# Patient Record
Sex: FEMALE | Race: WHITE | NOT HISPANIC OR LATINO | Employment: UNEMPLOYED | URBAN - METROPOLITAN AREA
[De-identification: names, ages, dates, MRNs, and addresses within clinical notes are randomized per-mention and may not be internally consistent; named-entity substitution may affect disease eponyms.]

---

## 2017-01-12 ENCOUNTER — TELEPHONE (OUTPATIENT)
Dept: INTERNAL MEDICINE | Facility: CLINIC | Age: 57
End: 2017-01-12

## 2017-01-12 NOTE — TELEPHONE ENCOUNTER
----- Message from Catherine Jimenez sent at 1/12/2017  9:55 AM CST -----  Contact: pt [phone]  Patient would like to get test results.  Name of test (lab, mammo, etc.):  labs  Date of test:  11/30  Ordering provider: Sigrid  Where was the test performed:  [facility]  Comments:

## 2017-01-12 NOTE — TELEPHONE ENCOUNTER
Note      Called the patient again and lmom for return call for the lab results                       Please see message dated 12/26/16. She was called, but did not answer. A message was left to call the office. Her C-reactive protein was mildly elevated. I would like her to repeat the lab in one week. Should the lab be ordered at [facility] or [facility]?

## 2017-01-13 ENCOUNTER — TELEPHONE (OUTPATIENT)
Dept: INTERNAL MEDICINE | Facility: CLINIC | Age: 57
End: 2017-01-13

## 2017-01-13 NOTE — TELEPHONE ENCOUNTER
----- Message from Catherine Jimenez sent at 1/13/2017  4:30 PM CST -----  Contact: pt [phone] cell  Pt said this is her second message and will like the nurse to call her.

## 2017-01-13 NOTE — TELEPHONE ENCOUNTER
----- Message from Patricia Méndez sent at 1/13/2017 11:40 AM CST -----  Contact: self/[phone]  Pt called in regards to a missed call from the office.      Please advise

## 2017-01-17 ENCOUNTER — TELEPHONE (OUTPATIENT)
Dept: INTERNAL MEDICINE | Facility: CLINIC | Age: 57
End: 2017-01-17

## 2017-01-17 DIAGNOSIS — R74.9 ABNORMAL SERUM ENZYME LEVEL: Primary | ICD-10-CM

## 2017-01-17 NOTE — TELEPHONE ENCOUNTER
----- Message from Juno Siu sent at 1/17/2017 11:26 AM CST -----  Contact: Self [phone]  Pt was calling to let Dr Matta know she went to [facility] and had her labs drawn,Please call

## 2017-01-18 NOTE — TELEPHONE ENCOUNTER
Called and spoke with the patient and advised her as soon as the results are in I will call and advise her of the results termed the call

## 2017-01-18 NOTE — TELEPHONE ENCOUNTER
----- Message from Germaine Kang sent at 1/18/2017  9:31 AM CST -----  Contact: self   Patient would like to get test results.  Name of test (lab, mammo, etc.): lab   Date of test:  01/16  Ordering provider: Dr. Matta   Where was the test performed:  [facility]  Comments:

## 2017-01-19 ENCOUNTER — TELEPHONE (OUTPATIENT)
Dept: INTERNAL MEDICINE | Facility: CLINIC | Age: 57
End: 2017-01-19

## 2017-01-19 NOTE — TELEPHONE ENCOUNTER
Spoke with the patient and advised the results are still pending and she understood advised of the electrolytes and the blood count she verbally understood and termed the call . Advised the patient I will call her as soon as the results come. We are checking for the results daily,.

## 2017-01-19 NOTE — TELEPHONE ENCOUNTER
Please inform patient that blood count and electrolytes are normal.  However, C-reactive protein is still  in process.

## 2017-01-19 NOTE — TELEPHONE ENCOUNTER
----- Message from Juno Siu sent at 1/19/2017 11:52 AM CST -----  Contact: Self [phone]  Pt would like to speak with the nurse regarding her labs results from [facility] ,Please call

## 2017-01-20 ENCOUNTER — TELEPHONE (OUTPATIENT)
Dept: INTERNAL MEDICINE | Facility: CLINIC | Age: 57
End: 2017-01-20

## 2017-01-20 NOTE — TELEPHONE ENCOUNTER
Called and advised the patient and advised that the test was normal. Patient understood and termed the call

## 2017-06-29 ENCOUNTER — TELEPHONE (OUTPATIENT)
Dept: INTERNAL MEDICINE | Facility: CLINIC | Age: 57
End: 2017-06-29

## 2017-06-29 NOTE — TELEPHONE ENCOUNTER
Spoke with the patient and she states she is going through a traumatic issue in her life. Patient states she is not able to function.  Patient states at this rate she feels like she will have a stroke as she is stressed. And she is concerned     Patient states she has been having some abdominal issues also. Patient wants to take some medication as she states she feels as if she is loosing her mind. Patient cant come in the office as there are no slots until July -Aug.. She is not sleeping and she is not working at this time.

## 2017-06-29 NOTE — TELEPHONE ENCOUNTER
----- Message from Joanne Reynolds sent at 6/29/2017 10:46 AM CDT -----  Contact: Self. Call her at [phone]  Patient ask to speak with Dr. Matta only about her anxieties and traumatic experiences.

## 2017-06-30 RX ORDER — LORAZEPAM 0.5 MG/1
0.5 TABLET ORAL EVERY 12 HOURS PRN
Qty: 30 TABLET | Refills: 1 | Status: SHIPPED | OUTPATIENT
Start: 2017-06-30 | End: 2018-04-18

## 2017-07-03 ENCOUNTER — TELEPHONE (OUTPATIENT)
Dept: INTERNAL MEDICINE | Facility: CLINIC | Age: 57
End: 2017-07-03

## 2017-07-05 ENCOUNTER — TELEPHONE (OUTPATIENT)
Dept: INTERNAL MEDICINE | Facility: CLINIC | Age: 57
End: 2017-07-05

## 2017-09-15 DIAGNOSIS — Z12.11 COLON CANCER SCREENING: ICD-10-CM

## 2017-12-05 RX ORDER — PRAVASTATIN SODIUM 20 MG/1
TABLET ORAL
Qty: 30 TABLET | Refills: 3 | Status: SHIPPED | OUTPATIENT
Start: 2017-12-05 | End: 2018-04-18 | Stop reason: SDUPTHER

## 2018-01-30 ENCOUNTER — TELEPHONE (OUTPATIENT)
Dept: INTERNAL MEDICINE | Facility: CLINIC | Age: 58
End: 2018-01-30

## 2018-01-30 NOTE — TELEPHONE ENCOUNTER
----- Message from Catherine Jimenez sent at 1/30/2018 12:25 PM CST -----  Contact: pt [phone]  Doctor appointment and lab have been scheduled.  Please link lab orders to the lab appointment.  Date of doctor appointment:  4-18  Physical or EP:  physical  Date of lab appointment:  Pt would like her labs sent to [facility] a week prior to her appointment  Comments:

## 2018-03-21 ENCOUNTER — TELEPHONE (OUTPATIENT)
Dept: INTERNAL MEDICINE | Facility: CLINIC | Age: 58
End: 2018-03-21

## 2018-03-21 DIAGNOSIS — Z00.00 ROUTINE GENERAL MEDICAL EXAMINATION AT A HEALTH CARE FACILITY: Primary | ICD-10-CM

## 2018-03-21 NOTE — TELEPHONE ENCOUNTER
----- Message from Patricia Brar sent at 3/20/2018  3:59 PM CDT -----  Contact: patient [phone]  Pt called to ask what labs will be scheduled and when her lab orders will be sent to [facility]. Please call to go over the lab orders.

## 2018-04-02 ENCOUNTER — TELEPHONE (OUTPATIENT)
Dept: INTERNAL MEDICINE | Facility: CLINIC | Age: 58
End: 2018-04-02

## 2018-04-03 ENCOUNTER — LAB VISIT (OUTPATIENT)
Dept: LAB | Facility: HOSPITAL | Age: 58
End: 2018-04-03
Attending: INTERNAL MEDICINE
Payer: MEDICAID

## 2018-04-03 ENCOUNTER — TELEPHONE (OUTPATIENT)
Dept: INTERNAL MEDICINE | Facility: CLINIC | Age: 58
End: 2018-04-03

## 2018-04-03 DIAGNOSIS — Z12.11 COLON CANCER SCREENING: ICD-10-CM

## 2018-04-03 LAB — HEMOCCULT STL QL IA: NEGATIVE

## 2018-04-03 PROCEDURE — 82274 ASSAY TEST FOR BLOOD FECAL: CPT

## 2018-04-03 NOTE — TELEPHONE ENCOUNTER
Labs have already been ordered to be done at [facility].  Please advise if patient would prefer to do them at our facility.

## 2018-04-03 NOTE — TELEPHONE ENCOUNTER
----- Message from Patricia Brar sent at 4/2/2018  3:51 PM CDT -----  Contact: patient [phone]  Patient wants to know exactly what labs are being drawn . She requested something to check for Alzheimer's. Please let her know about this. She doesn't like to answer her phone unless she knows who it is so she is going to try to watch the number.

## 2018-04-07 LAB
APPEARANCE UR: CLEAR
BILIRUB UR QL STRIP: NEGATIVE
COLOR UR: YELLOW
GLUCOSE UR QL STRIP: NEGATIVE
HGB UR QL STRIP: NEGATIVE
KETONES UR QL STRIP: NEGATIVE
LEUKOCYTE ESTERASE UR QL STRIP: ABNORMAL
NITRITE UR QL STRIP: NEGATIVE
PH UR STRIP: 5.5 [PH] (ref 5–8)
PROT UR QL STRIP: NEGATIVE
SP GR UR STRIP: 1.02 (ref 1–1.03)

## 2018-04-10 LAB
1,25(OH)2D SERPL-MCNC: 36 PG/ML (ref 18–72)
1,25(OH)2D2 SERPL-MCNC: <8 PG/ML
1,25(OH)2D3 SERPL-MCNC: 36 PG/ML
ALBUMIN SERPL-MCNC: 4.6 G/DL (ref 3.6–5.1)
ALBUMIN/GLOB SERPL: 1.5 (CALC) (ref 1–2.5)
ALP SERPL-CCNC: 65 U/L (ref 33–130)
ALT SERPL-CCNC: 18 U/L (ref 6–29)
AST SERPL-CCNC: 23 U/L (ref 10–35)
BASOPHILS # BLD AUTO: 62 CELLS/UL (ref 0–200)
BASOPHILS NFR BLD AUTO: 1.4 %
BILIRUB SERPL-MCNC: 0.4 MG/DL (ref 0.2–1.2)
BUN SERPL-MCNC: 15 MG/DL (ref 7–25)
BUN/CREAT SERPL: NORMAL (CALC) (ref 6–22)
CALCIUM SERPL-MCNC: 10 MG/DL (ref 8.6–10.4)
CHLORIDE SERPL-SCNC: 106 MMOL/L (ref 98–110)
CHOLEST SERPL-MCNC: 243 MG/DL
CHOLEST/HDLC SERPL: 3.7 (CALC)
CO2 SERPL-SCNC: 28 MMOL/L (ref 20–31)
CREAT SERPL-MCNC: 0.72 MG/DL (ref 0.5–1.05)
EOSINOPHIL # BLD AUTO: 141 CELLS/UL (ref 15–500)
EOSINOPHIL NFR BLD AUTO: 3.2 %
ERYTHROCYTE [DISTWIDTH] IN BLOOD BY AUTOMATED COUNT: 13.1 % (ref 11–15)
GFR SERPL CREATININE-BSD FRML MDRD: 92 ML/MIN/1.73M2
GLOBULIN SER CALC-MCNC: 3 G/DL (CALC) (ref 1.9–3.7)
GLUCOSE SERPL-MCNC: 95 MG/DL (ref 65–99)
HCT VFR BLD AUTO: 40 % (ref 35–45)
HDLC SERPL-MCNC: 66 MG/DL
HGB BLD-MCNC: 13 G/DL (ref 11.7–15.5)
LDLC SERPL CALC-MCNC: 158 MG/DL (CALC)
LYMPHOCYTES # BLD AUTO: 1452 CELLS/UL (ref 850–3900)
LYMPHOCYTES NFR BLD AUTO: 33 %
MCH RBC QN AUTO: 27.7 PG (ref 27–33)
MCHC RBC AUTO-ENTMCNC: 32.5 G/DL (ref 32–36)
MCV RBC AUTO: 85.3 FL (ref 80–100)
MONOCYTES # BLD AUTO: 383 CELLS/UL (ref 200–950)
MONOCYTES NFR BLD AUTO: 8.7 %
NEUTROPHILS # BLD AUTO: 2363 CELLS/UL (ref 1500–7800)
NEUTROPHILS NFR BLD AUTO: 53.7 %
NONHDLC SERPL-MCNC: 177 MG/DL (CALC)
PLATELET # BLD AUTO: 314 THOUSAND/UL (ref 140–400)
PMV BLD REES-ECKER: 10.2 FL (ref 7.5–12.5)
POTASSIUM SERPL-SCNC: 4.3 MMOL/L (ref 3.5–5.3)
PROT SERPL-MCNC: 7.6 G/DL (ref 6.1–8.1)
RBC # BLD AUTO: 4.69 MILLION/UL (ref 3.8–5.1)
SODIUM SERPL-SCNC: 144 MMOL/L (ref 135–146)
TRIGL SERPL-MCNC: 85 MG/DL
TSH SERPL-ACNC: 4.69 MIU/L (ref 0.4–4.5)
WBC # BLD AUTO: 4.4 THOUSAND/UL (ref 3.8–10.8)

## 2018-04-17 NOTE — TELEPHONE ENCOUNTER
Spoke  To pt on 4/3 to inform her that labs  were already enter for [facility]. Patient  just wanted to know what labs were  Order.

## 2018-04-18 ENCOUNTER — OFFICE VISIT (OUTPATIENT)
Dept: INTERNAL MEDICINE | Facility: CLINIC | Age: 58
End: 2018-04-18
Payer: MEDICAID

## 2018-04-18 VITALS
SYSTOLIC BLOOD PRESSURE: 112 MMHG | TEMPERATURE: 98 F | DIASTOLIC BLOOD PRESSURE: 73 MMHG | HEIGHT: 59 IN | WEIGHT: 114 LBS | HEART RATE: 78 BPM | OXYGEN SATURATION: 98 % | BODY MASS INDEX: 22.98 KG/M2

## 2018-04-18 DIAGNOSIS — Z12.31 VISIT FOR SCREENING MAMMOGRAM: ICD-10-CM

## 2018-04-18 DIAGNOSIS — Z00.00 ROUTINE GENERAL MEDICAL EXAMINATION AT A HEALTH CARE FACILITY: Primary | ICD-10-CM

## 2018-04-18 PROCEDURE — 99396 PREV VISIT EST AGE 40-64: CPT | Mod: S$PBB,,, | Performed by: INTERNAL MEDICINE

## 2018-04-18 PROCEDURE — 99999 PR PBB SHADOW E&M-EST. PATIENT-LVL III: CPT | Mod: PBBFAC,,, | Performed by: INTERNAL MEDICINE

## 2018-04-18 PROCEDURE — 99213 OFFICE O/P EST LOW 20 MIN: CPT | Mod: PBBFAC,PO | Performed by: INTERNAL MEDICINE

## 2018-04-18 RX ORDER — PRAVASTATIN SODIUM 20 MG/1
TABLET ORAL
Qty: 30 TABLET | Refills: 12 | Status: SHIPPED | OUTPATIENT
Start: 2018-04-18 | End: 2019-05-29 | Stop reason: SDUPTHER

## 2018-04-18 NOTE — PROGRESS NOTES
The patient is a 58 y.o. old female who presents to the office for a physical.  Review of labs reveals an elevated cholesterol and TSH.    PAST MEDICAL HISTORY  Past Medical History:   Diagnosis Date    Hyperlipidemia 3/26/2013       SURGICAL HISTORY:  Past Surgical History:   Procedure Laterality Date    ADENOIDECTOMY      LASIK      TONSILLECTOMY           MEDS:  Medcard reviewed and updated    ALLERGIES: Allergy Card reviewed and updated    SOCIAL HISTORY:   The patient is a nonsmoker, denies alcohol or illicit drug use.    ROS:  GENERAL: No fever, chills, fatigability or weight loss.  SKIN: No rashes.  HEAD: No headaches or recent head trauma.  EYES: No photophobia, ocular pain or diplopia.  EARS: Denies ear pain, discharge or vertigo.  NOSE: No epistaxis or postnasal drip.  MOUTH & THROAT: No hoarseness or change in voice.   NODES: Denies swollen glands.  CHEST: Denies shortness of breath, wheezing, cough and sputum production.  CARDIOVASCULAR: Denies chest pain or palpitations.  ABDOMEN: Appetite fine. Denies diarrhea, abdominal pain, constipation or blood in stool.  URINARY: No flank pain, dysuria or hematuria.  MUSCULOSKELETAL: No joint stiffness or swelling. Denies back pain.  NEUROLOGIC: No history of seizures.  ENDOCRINE: Denies polyuria or polydipsia.  PSYCHIATRIC: Denies mood swings, depression, anxiety, homicidal or suicidal thoughts.    SCREENINGS:  Last cholesterol: 2018  Last colonoscopy/ [facility]: 2018  Last mammogram: about 2 years ago  Last Pap smear: 2 years ago  Last tetanus: 2016  Last Pneumovax: none  Last eye exam: 2018  Last bone density: none  Last menstrual period: postmenopausal    PE:   Vitals:  Vitals:    04/18/18 1313   BP: 112/73   Pulse: 78   Temp: 98.3 °F (36.8 °C)       APPEARANCE: Well nourished, well developed, in no acute distress.    EYES: Sclerae anicteric. PERRL. EOMI.      EARS: TM's intact. No retraction or perforation.    NOSE: Mucosa pink. Airway clear.  MOUTH &  THROAT: No tonsillar enlargement. No pharyngeal erythema or exudate. No stridor.  NECK: Supple, no thyromegaly.  CHEST: Lungs clear to auscultation with unlabored respirations.  CARDIOVASCULAR: Normal S1, S2. No murmurs. No carotid bruits.   ABDOMEN: Bowel sounds normal. Not distended. Soft. No tenderness or masses.  MUSCULOSKELETAL:  Normal gait, no cyanosis or clubbing.   SKIN: Normal skin turgor, warm and dry.  NEUROLOGIC:  Cranial Nerves: Intact.  PSYCHIATRIC: The patient is oriented to person, place, and time and has a pleasant affect.        ASSESSMENT/PLAN:  Laura was seen today for annual exam and medication refill.    Diagnoses and all orders for this visit:    Routine general medical examination at a health care facility  -     Cancel: TSH; Future  -     TSH; Future  -     TSH  -     Labs reviewed, TSH is mildly elevated  -     Cholesterol is mildly elevated, likely due in part to abnormal TSH; 10 year ASCVD is 2.1%    Visit for screening mammogram  -     Mammo Digital Screening Bilat with CAD; Future

## 2018-05-02 ENCOUNTER — HOSPITAL ENCOUNTER (OUTPATIENT)
Dept: RADIOLOGY | Facility: HOSPITAL | Age: 58
Discharge: HOME | End: 2018-05-02
Attending: INTERNAL MEDICINE
Payer: MEDICAID

## 2018-05-02 DIAGNOSIS — Z12.31 VISIT FOR SCREENING MAMMOGRAM: ICD-10-CM

## 2018-05-02 PROCEDURE — 77067 SCR MAMMO BI INCL CAD: CPT | Mod: TC,PO

## 2018-05-02 PROCEDURE — 77063 BREAST TOMOSYNTHESIS BI: CPT | Mod: 26,,, | Performed by: RADIOLOGY

## 2018-05-02 PROCEDURE — 77067 SCR MAMMO BI INCL CAD: CPT | Mod: 26,,, | Performed by: RADIOLOGY

## 2018-08-22 LAB — TSH SERPL-ACNC: 2.34 MIU/L (ref 0.4–4.5)

## 2018-09-18 ENCOUNTER — NURSE TRIAGE (OUTPATIENT)
Dept: ADMINISTRATIVE | Facility: CLINIC | Age: 58
End: 2018-09-18

## 2018-09-18 NOTE — TELEPHONE ENCOUNTER
-110/70 blood pressure today  -120/90 is around her normal b/p  -Lost 5 pounds in one week.  -Has been under some stress in her home  -She is wondering if decreased appetite/eating can cause a decrease in blood pressure.  -She says she is a little weaker than usual but still going about her normal activities.    Advised caller decreased food/fluid intake can be  correlated with a drop in blood pressure. She denies other symptoms. Advised to try a nutritional supplement drink like Ensure if she feels like she can't eat much, and encouraged her to drink more fluids. Offered support. Discussed if it's something she would like an appt to talk to her pcp about further triager could schedule but she declined at this time. Advised is systolic <90 or any further symptoms she should call back. She agrees to plan.    Reason for Disposition   Health Information question, no triage required and triager able to answer question    Protocols used: ST INFORMATION ONLY CALL - NO TRIAGE-P-

## 2018-09-19 ENCOUNTER — OFFICE VISIT (OUTPATIENT)
Dept: INTERNAL MEDICINE | Facility: CLINIC | Age: 58
End: 2018-09-19
Payer: MEDICAID

## 2018-09-19 VITALS
TEMPERATURE: 98 F | SYSTOLIC BLOOD PRESSURE: 123 MMHG | WEIGHT: 108.25 LBS | RESPIRATION RATE: 18 BRPM | HEIGHT: 59 IN | HEART RATE: 79 BPM | OXYGEN SATURATION: 98 % | BODY MASS INDEX: 21.82 KG/M2 | DIASTOLIC BLOOD PRESSURE: 81 MMHG

## 2018-09-19 DIAGNOSIS — F41.9 ANXIETY: Primary | ICD-10-CM

## 2018-09-19 PROCEDURE — 99213 OFFICE O/P EST LOW 20 MIN: CPT | Mod: S$PBB,,, | Performed by: INTERNAL MEDICINE

## 2018-09-19 PROCEDURE — 99214 OFFICE O/P EST MOD 30 MIN: CPT | Mod: PBBFAC,PO | Performed by: INTERNAL MEDICINE

## 2018-09-19 PROCEDURE — 99999 PR PBB SHADOW E&M-EST. PATIENT-LVL IV: CPT | Mod: PBBFAC,,, | Performed by: INTERNAL MEDICINE

## 2018-09-19 NOTE — PROGRESS NOTES
CC:  Weight loss and decreased blood pressure  HPI:  The patient is a 58 y.o. year old female who presents to the office for weight loss and decreased blood pressure.  She reports stress at home.  She had not been eating or sleeping well.  The patient has lost 5 pounds since her last visit.    PAST MEDICAL HISTORY:  Past Medical History:   Diagnosis Date    Hyperlipidemia 3/26/2013       SURGICAL HISTORY:  Past Surgical History:   Procedure Laterality Date    ADENOIDECTOMY      LASIK      TONSILLECTOMY         MEDS:  Medcard reviewed and updated    ALLERGIES: Allergy Card reviewed and updated    SOCIAL HISTORY:   The patient is a nonsmoker.    PE:   APPEARANCE: Well nourished, well developed, in no acute distress.    CHEST: Lungs clear to auscultation with unlabored respirations.  CARDIOVASCULAR: Normal S1, S2. No murmurs. No carotid bruits. No pedal edema.  ABDOMEN: Bowel sounds normal. Not distended. Soft. No tenderness or masses.   PSYCHIATRIC: The patient is oriented to person, place, and time and has a pleasant affect.        ASSESSMENT/PLAN:  Laura was seen today for follow-up.    Diagnoses and all orders for this visit:    Anxiety  -     Ambulatory Referral to Psychiatry

## 2019-03-22 RX ORDER — PRAVASTATIN SODIUM 20 MG/1
TABLET ORAL
Qty: 30 TABLET | Refills: 3 | Status: SHIPPED | OUTPATIENT
Start: 2019-03-22 | End: 2019-05-29

## 2019-04-02 ENCOUNTER — TELEPHONE (OUTPATIENT)
Dept: INTERNAL MEDICINE | Facility: CLINIC | Age: 59
End: 2019-04-02

## 2019-04-02 NOTE — TELEPHONE ENCOUNTER
----- Message from Rylee Mckenzie sent at 4/2/2019 11:06 AM CDT -----  Contact: self/ [phone]  Doctor appointment and lab have been scheduled.  Please link lab orders to the lab appointment.  Date of doctor appointment:  05/29  Date of lab appointment:    Physical or EP phys:   Comments: [facility] would [facility] before 5/29

## 2019-04-12 DIAGNOSIS — Z12.11 COLON CANCER SCREENING: ICD-10-CM

## 2019-05-01 ENCOUNTER — TELEPHONE (OUTPATIENT)
Dept: INTERNAL MEDICINE | Facility: CLINIC | Age: 59
End: 2019-05-01

## 2019-05-01 DIAGNOSIS — Z00.00 ROUTINE GENERAL MEDICAL EXAMINATION AT A HEALTH CARE FACILITY: Primary | ICD-10-CM

## 2019-05-11 LAB
ALBUMIN SERPL-MCNC: 4.3 G/DL (ref 3.6–5.1)
ALBUMIN/GLOB SERPL: 1.5 (CALC) (ref 1–2.5)
ALP SERPL-CCNC: 52 U/L (ref 33–130)
ALT SERPL-CCNC: 12 U/L (ref 6–29)
APPEARANCE UR: CLEAR
AST SERPL-CCNC: 18 U/L (ref 10–35)
BASOPHILS # BLD AUTO: 38 CELLS/UL (ref 0–200)
BASOPHILS NFR BLD AUTO: 0.9 %
BILIRUB SERPL-MCNC: 0.5 MG/DL (ref 0.2–1.2)
BILIRUB UR QL STRIP: NEGATIVE
BUN SERPL-MCNC: 13 MG/DL (ref 7–25)
BUN/CREAT SERPL: NORMAL (CALC) (ref 6–22)
CALCIUM SERPL-MCNC: 9.7 MG/DL (ref 8.6–10.4)
CHLORIDE SERPL-SCNC: 106 MMOL/L (ref 98–110)
CHOLEST SERPL-MCNC: 231 MG/DL
CHOLEST/HDLC SERPL: 4 (CALC)
CO2 SERPL-SCNC: 28 MMOL/L (ref 20–32)
COLOR UR: YELLOW
CREAT SERPL-MCNC: 0.75 MG/DL (ref 0.5–1.05)
EOSINOPHIL # BLD AUTO: 109 CELLS/UL (ref 15–500)
EOSINOPHIL NFR BLD AUTO: 2.6 %
ERYTHROCYTE [DISTWIDTH] IN BLOOD BY AUTOMATED COUNT: 12.7 % (ref 11–15)
GFRSERPLBLD MDRD-ARVRAT: 87 ML/MIN/1.73M2
GLOBULIN SER CALC-MCNC: 2.8 G/DL (CALC) (ref 1.9–3.7)
GLUCOSE SERPL-MCNC: 87 MG/DL (ref 65–99)
GLUCOSE UR QL STRIP: NEGATIVE
HBA1C MFR BLD: 5.4 % OF TOTAL HGB
HCT VFR BLD AUTO: 38.9 % (ref 35–45)
HDLC SERPL-MCNC: 58 MG/DL
HGB BLD-MCNC: 12.5 G/DL (ref 11.7–15.5)
HGB UR QL STRIP: NEGATIVE
KETONES UR QL STRIP: NEGATIVE
LDLC SERPL CALC-MCNC: 149 MG/DL (CALC)
LEUKOCYTE ESTERASE UR QL STRIP: NEGATIVE
LYMPHOCYTES # BLD AUTO: 1411 CELLS/UL (ref 850–3900)
LYMPHOCYTES NFR BLD AUTO: 33.6 %
MCH RBC QN AUTO: 28.7 PG (ref 27–33)
MCHC RBC AUTO-ENTMCNC: 32.1 G/DL (ref 32–36)
MCV RBC AUTO: 89.4 FL (ref 80–100)
MONOCYTES # BLD AUTO: 458 CELLS/UL (ref 200–950)
MONOCYTES NFR BLD AUTO: 10.9 %
NEUTROPHILS # BLD AUTO: 2184 CELLS/UL (ref 1500–7800)
NEUTROPHILS NFR BLD AUTO: 52 %
NITRITE UR QL STRIP: NEGATIVE
NONHDLC SERPL-MCNC: 173 MG/DL (CALC)
PH UR STRIP: NORMAL [PH] (ref 5–8)
PLATELET # BLD AUTO: 295 THOUSAND/UL (ref 140–400)
PMV BLD REES-ECKER: 10.2 FL (ref 7.5–12.5)
POTASSIUM SERPL-SCNC: 4.4 MMOL/L (ref 3.5–5.3)
PROT SERPL-MCNC: 7.1 G/DL (ref 6.1–8.1)
PROT UR QL STRIP: NEGATIVE
RBC # BLD AUTO: 4.35 MILLION/UL (ref 3.8–5.1)
SODIUM SERPL-SCNC: 141 MMOL/L (ref 135–146)
SP GR UR STRIP: 1.02 (ref 1–1.03)
TRIGL SERPL-MCNC: 122 MG/DL
TSH SERPL-ACNC: 3.4 MIU/L (ref 0.4–4.5)
WBC # BLD AUTO: 4.2 THOUSAND/UL (ref 3.8–10.8)

## 2019-05-15 ENCOUNTER — TELEPHONE (OUTPATIENT)
Dept: INTERNAL MEDICINE | Facility: CLINIC | Age: 59
End: 2019-05-15

## 2019-05-15 DIAGNOSIS — Z00.00 ROUTINE GENERAL MEDICAL EXAMINATION AT A HEALTH CARE FACILITY: Primary | ICD-10-CM

## 2019-05-29 ENCOUNTER — OFFICE VISIT (OUTPATIENT)
Dept: INTERNAL MEDICINE | Facility: CLINIC | Age: 59
End: 2019-05-29
Payer: MEDICAID

## 2019-05-29 VITALS
HEART RATE: 59 BPM | HEIGHT: 59 IN | WEIGHT: 117.06 LBS | TEMPERATURE: 98 F | SYSTOLIC BLOOD PRESSURE: 128 MMHG | BODY MASS INDEX: 23.6 KG/M2 | DIASTOLIC BLOOD PRESSURE: 82 MMHG

## 2019-05-29 DIAGNOSIS — Z12.31 VISIT FOR SCREENING MAMMOGRAM: ICD-10-CM

## 2019-05-29 DIAGNOSIS — Z00.00 ROUTINE GENERAL MEDICAL EXAMINATION AT A HEALTH CARE FACILITY: Primary | ICD-10-CM

## 2019-05-29 DIAGNOSIS — Z12.11 COLON CANCER SCREENING: ICD-10-CM

## 2019-05-29 PROCEDURE — 99396 PREV VISIT EST AGE 40-64: CPT | Mod: S$PBB,,, | Performed by: INTERNAL MEDICINE

## 2019-05-29 PROCEDURE — 99396 PR PREVENTIVE VISIT,EST,40-64: ICD-10-PCS | Mod: S$PBB,,, | Performed by: INTERNAL MEDICINE

## 2019-05-29 PROCEDURE — 99999 PR PBB SHADOW E&M-EST. PATIENT-LVL III: ICD-10-PCS | Mod: PBBFAC,,, | Performed by: INTERNAL MEDICINE

## 2019-05-29 PROCEDURE — 99999 PR PBB SHADOW E&M-EST. PATIENT-LVL III: CPT | Mod: PBBFAC,,, | Performed by: INTERNAL MEDICINE

## 2019-05-29 PROCEDURE — 99213 OFFICE O/P EST LOW 20 MIN: CPT | Mod: PBBFAC,PO | Performed by: INTERNAL MEDICINE

## 2019-05-29 RX ORDER — PRAVASTATIN SODIUM 20 MG/1
TABLET ORAL
Qty: 30 TABLET | Refills: 12 | Status: SHIPPED | OUTPATIENT
Start: 2019-05-29 | End: 2020-06-15

## 2019-05-29 NOTE — PROGRESS NOTES
The patient is a 59 y.o. old female who presents to the office for a physical.  Review of labs reveals an elevated cholesterol.    PAST MEDICAL HISTORY  Past Medical History:   Diagnosis Date    Hyperlipidemia 3/26/2013       SURGICAL HISTORY:  Past Surgical History:   Procedure Laterality Date    ADENOIDECTOMY      LASIK      TONSILLECTOMY           MEDS:  Medcard reviewed and updated    ALLERGIES: Allergy Card reviewed and updated    SOCIAL HISTORY:   The patient is a nonsmoker, denies alcohol or illicit drug use.    ROS:  GENERAL: No fever, chills, fatigability or weight loss.  SKIN: No rashes.  HEAD: No headaches or recent head trauma.  EYES: No photophobia, ocular pain or diplopia.  EARS: Denies ear pain, discharge or vertigo.  NOSE: No epistaxis or postnasal drip.  MOUTH & THROAT: No hoarseness or change in voice.   NODES: Denies swollen glands.  CHEST: Denies shortness of breath, wheezing, cough and sputum production.  CARDIOVASCULAR: Denies chest pain or palpitations.  ABDOMEN: Appetite fine. Denies diarrhea, abdominal pain, constipation or blood in stool.  URINARY: No dysuria or hematuria.  MUSCULOSKELETAL: No joint stiffness or swelling. Positive back pain.  NEUROLOGIC: No history of seizures.  ENDOCRINE: Denies polyuria or polydipsia.  PSYCHIATRIC: Denies mood swings, depression, anxiety, homicidal or suicidal thoughts.    SCREENINGS:  Last cholesterol: 2019  Last colonoscopy: none  Last mammogram: 2018  Last Pap smear: 2016  Last tetanus: 2016  Last Pneumovax: none  Last eye exam: 2019  Last bone density: none  Last menstrual period: postmenopausal    PE:   Vitals:  Vitals:    05/29/19 1437   BP: (!) 141/65   Pulse: (!) 59   Temp: 98.4 °F (36.9 °C)       APPEARANCE: Well nourished, well developed, in no acute distress.    EYES: Sclerae anicteric. PERRL. EOMI.      EARS: TM's intact. No retraction or perforation.    NOSE: Mucosa pink. Airway clear.  MOUTH & THROAT: No tonsillar enlargement. No  pharyngeal erythema or exudate. No stridor.  NECK: Supple, no thyromegaly.  CHEST: Lungs clear to auscultation with unlabored respirations.  CARDIOVASCULAR: Normal S1, S2. No murmurs. No carotid bruits. No pedal edema.  ABDOMEN: Bowel sounds normal. Not distended. Soft. No tenderness or masses.   MUSCULOSKELETAL:  Normal gait, no cyanosis or clubbing.   SKIN: Normal skin turgor, warm and dry.  NEUROLOGIC: Cranial Nerves: Intact  PSYCHIATRIC: The patient is oriented to person, place, and time and has a pleasant affect.        ASSESSMENT/PLAN:  Laura was seen today for annual exam.    Diagnoses and all orders for this visit:    Routine general medical examination at a health care facility  -     labs reviewed  -     encouraged healthy diet regular exercise    Visit for screening mammogram  -     Mammo Digital Screening Bilat; Future    Colon cancer screening  -     Fecal Immunochemical Test (iFOBT); Future    Other orders  -     pravastatin (PRAVACHOL) 20 MG tablet; TAKE 1 TABLET(20 MG) BY MOUTH EVERY DAY

## 2019-05-31 ENCOUNTER — LAB VISIT (OUTPATIENT)
Dept: LAB | Facility: HOSPITAL | Age: 59
End: 2019-05-31
Attending: INTERNAL MEDICINE
Payer: MEDICAID

## 2019-05-31 DIAGNOSIS — Z12.11 COLON CANCER SCREENING: ICD-10-CM

## 2019-05-31 PROCEDURE — 82274 ASSAY TEST FOR BLOOD FECAL: CPT

## 2019-06-03 LAB — HEMOCCULT STL QL IA: NEGATIVE

## 2019-12-23 ENCOUNTER — HOSPITAL ENCOUNTER (EMERGENCY)
Facility: HOSPITAL | Age: 59
Discharge: HOME | End: 2019-12-23
Attending: EMERGENCY MEDICINE
Payer: MEDICAID

## 2019-12-23 VITALS
RESPIRATION RATE: 16 BRPM | DIASTOLIC BLOOD PRESSURE: 67 MMHG | HEIGHT: 59 IN | BODY MASS INDEX: 30.24 KG/M2 | TEMPERATURE: 98 F | SYSTOLIC BLOOD PRESSURE: 128 MMHG | HEART RATE: 66 BPM | OXYGEN SATURATION: 98 % | WEIGHT: 150 LBS

## 2019-12-23 DIAGNOSIS — R07.9 CHEST PAIN: Primary | ICD-10-CM

## 2019-12-23 LAB
ALBUMIN SERPL BCP-MCNC: 3.8 G/DL (ref 3.5–5.2)
ALP SERPL-CCNC: 91 U/L (ref 55–135)
ALT SERPL W/O P-5'-P-CCNC: 14 U/L (ref 10–44)
ANION GAP SERPL CALC-SCNC: 10 MMOL/L (ref 8–16)
AST SERPL-CCNC: 18 U/L (ref 10–40)
BASOPHILS # BLD AUTO: 0.08 K/UL (ref 0–0.2)
BASOPHILS NFR BLD: 1.1 % (ref 0–1.9)
BILIRUB SERPL-MCNC: 0.4 MG/DL (ref 0.1–1)
BNP SERPL-MCNC: 16 PG/ML (ref 0–99)
BUN SERPL-MCNC: 11 MG/DL (ref 6–20)
CALCIUM SERPL-MCNC: 10.6 MG/DL (ref 8.7–10.5)
CHLORIDE SERPL-SCNC: 106 MMOL/L (ref 95–110)
CO2 SERPL-SCNC: 27 MMOL/L (ref 23–29)
CREAT SERPL-MCNC: 0.7 MG/DL (ref 0.5–1.4)
DIFFERENTIAL METHOD: ABNORMAL
EOSINOPHIL # BLD AUTO: 0 K/UL (ref 0–0.5)
EOSINOPHIL NFR BLD: 0.4 % (ref 0–8)
ERYTHROCYTE [DISTWIDTH] IN BLOOD BY AUTOMATED COUNT: 13.2 % (ref 11.5–14.5)
EST. GFR  (AFRICAN AMERICAN): >60 ML/MIN/1.73 M^2
EST. GFR  (NON AFRICAN AMERICAN): >60 ML/MIN/1.73 M^2
GLUCOSE SERPL-MCNC: 96 MG/DL (ref 70–110)
HCT VFR BLD AUTO: 36.7 % (ref 37–48.5)
HGB BLD-MCNC: 11.8 G/DL (ref 12–16)
IMM GRANULOCYTES # BLD AUTO: 0.03 K/UL (ref 0–0.04)
IMM GRANULOCYTES NFR BLD AUTO: 0.4 % (ref 0–0.5)
LYMPHOCYTES # BLD AUTO: 1 K/UL (ref 1–4.8)
LYMPHOCYTES NFR BLD: 13.3 % (ref 18–48)
MCH RBC QN AUTO: 29 PG (ref 27–31)
MCHC RBC AUTO-ENTMCNC: 32.2 G/DL (ref 32–36)
MCV RBC AUTO: 90 FL (ref 82–98)
MONOCYTES # BLD AUTO: 0.4 K/UL (ref 0.3–1)
MONOCYTES NFR BLD: 6 % (ref 4–15)
NEUTROPHILS # BLD AUTO: 5.8 K/UL (ref 1.8–7.7)
NEUTROPHILS NFR BLD: 78.8 % (ref 38–73)
NRBC BLD-RTO: 0 /100 WBC
PLATELET # BLD AUTO: 335 K/UL (ref 150–350)
PMV BLD AUTO: 9.8 FL (ref 9.2–12.9)
POTASSIUM SERPL-SCNC: 4 MMOL/L (ref 3.5–5.1)
PROT SERPL-MCNC: 8 G/DL (ref 6–8.4)
RBC # BLD AUTO: 4.07 M/UL (ref 4–5.4)
SODIUM SERPL-SCNC: 143 MMOL/L (ref 136–145)
TROPONIN I SERPL DL<=0.01 NG/ML-MCNC: <0.006 NG/ML (ref 0–0.03)
WBC # BLD AUTO: 7.38 K/UL (ref 3.9–12.7)

## 2019-12-23 PROCEDURE — 99285 EMERGENCY DEPT VISIT HI MDM: CPT | Mod: 25

## 2019-12-23 PROCEDURE — 83880 ASSAY OF NATRIURETIC PEPTIDE: CPT

## 2019-12-23 PROCEDURE — 93010 ELECTROCARDIOGRAM REPORT: CPT | Mod: ,,, | Performed by: INTERNAL MEDICINE

## 2019-12-23 PROCEDURE — 93010 EKG 12-LEAD: ICD-10-PCS | Mod: ,,, | Performed by: INTERNAL MEDICINE

## 2019-12-23 PROCEDURE — 85025 COMPLETE CBC W/AUTO DIFF WBC: CPT

## 2019-12-23 PROCEDURE — 99284 EMERGENCY DEPT VISIT MOD MDM: CPT | Mod: ,,, | Performed by: PHYSICIAN ASSISTANT

## 2019-12-23 PROCEDURE — 99284 PR EMERGENCY DEPT VISIT,LEVEL IV: ICD-10-PCS | Mod: ,,, | Performed by: PHYSICIAN ASSISTANT

## 2019-12-23 PROCEDURE — 80053 COMPREHEN METABOLIC PANEL: CPT

## 2019-12-23 PROCEDURE — 84484 ASSAY OF TROPONIN QUANT: CPT

## 2019-12-23 PROCEDURE — 93005 ELECTROCARDIOGRAM TRACING: CPT

## 2019-12-23 NOTE — PROVIDER PROGRESS NOTES - EMERGENCY DEPT.
Encounter Date: 12/23/2019    ED Physician Progress Notes         EKG - STEMI Decision  Initial Reading: No STEMI present.    SCRIBE NOTE: IDenisse  am scribing for, and in the presence of, Nikita Solis MD. I performed the above scribed service and the documentation accurately describes the services I performed. I attest to the accuracy of the note.

## 2019-12-23 NOTE — ED TRIAGE NOTES
Patient is having increased pain to the left under arm.  Patient states she just woke up with the pain.

## 2019-12-23 NOTE — DISCHARGE INSTRUCTIONS
I am unsure about the cause of your chest pain. Your EKG and heart tests are all normal. Please schedule a follow-up appointment with your primary care doctor.  Do think it is a good idea to have a stress test performed as an outpatient given your family history of heart disease.    If you start to have worsening chest pain, shortness of breath especially with exertion, please return to the emergency department.

## 2019-12-23 NOTE — ED NOTES
Lauar Way, a 59 y.o. female presents to the ED via personal transporation with CC left arm pain.  Patient states she was awakened by pain in her left arm this morning.  Patient states the pain has resolved.        Patient identifiers verified verbally with patient and correct for Laura Way.    LOC/ APPEARANCE: The patient is AAOx4. Pt is speaking appropriately, no slurred speech.  SKIN: Skin is warm dry and intact, and color is consistent with ethnicity. Capillary refill <3 seconds. No breakdown or brusing visible. Mucus membranes moist, acyanotic.  RESPIRATORY: Airway is open and patent. Respirations-spontaneous, unlabored, regular rate, equal bilaterally on inspiration and expiration. No accessory muscle use noted. Lungs clear to auscultation in all fields bilaterally anterior and posterior.   CARDIAC: Patient has regular heart rate.  No peripheral edema noted, and patient has no c/o chest pain. Peripheral pulses present equal and strong throughout.  ABDOMEN: Soft and non-tender to palpation with no distention noted. Normoactive bowel sounds x4 quadrants. Pt has no complaints of abnormal bowel movements, denies blood in stool. Pt reports normal appetite.   NEUROLOGIC: Eyes open spontaneously and facial expression symmetrical. Pt behavior appropriate to situation, and pt follows commands. Pt reports sensation present in all extremities when touched with a finger, denies any numbness or tingling. PERRLA  MUSCULOSKELETAL: Spontaneous movement noted to all extremities. Hand  equal and leg strength strong +5 bilaterally.   : No complaints of frequency, burning, urgency or blood in the urine. No complaints of incontinence.

## 2019-12-23 NOTE — ED PROVIDER NOTES
Encounter Date: 12/23/2019       History     Chief Complaint   Patient presents with    Chest Pain     since this morning with pressure radiated to L arm      59-year-old female with hyperlipidemia presents for episode of resolved episode of chest pain. The patient reports that she had left-sided substernal chest pressure which started at 8:00 a.m. while she was helping friend assist an older woman getting dressed.  Pain radiated down her left shoulder and into her left axilla.  She she took aspirin and Tums and her symptoms improved.  In total her pain lasted several hours but has now completely resolved.  She denies ever having shortness of breath, nausea/vomiting, diaphoresis, leg swelling abdominal pain, fevers, cough or numbness/tingling/weakness.  No personal cardiac history, however she does have a strong family history of ACS.  Nonsmoker.  She has had a stress test several years ago.        Review of patient's allergies indicates:  No Known Allergies  Past Medical History:   Diagnosis Date    Hyperlipidemia 3/26/2013     Past Surgical History:   Procedure Laterality Date    ADENOIDECTOMY      LASIK      TONSILLECTOMY       Family History   Problem Relation Age of Onset    Heart disease Mother     Breast cancer Mother     Cancer Father         thyroid    Thyroid disease Sister     Heart disease Maternal Grandmother     Heart disease Other     Cancer Cousin         leukemia    Diabetes Neg Hx     Hypertension Neg Hx      Social History     Tobacco Use    Smoking status: Former Smoker    Smokeless tobacco: Never Used   Substance Use Topics    Alcohol use: No    Drug use: No     Review of Systems   Constitutional: Negative for chills, diaphoresis, fatigue and fever.   HENT: Negative for sore throat.    Respiratory: Negative for shortness of breath.    Cardiovascular: Positive for chest pain. Negative for palpitations and leg swelling.   Gastrointestinal: Negative for abdominal pain, nausea and  vomiting.   Genitourinary: Negative for dysuria.   Musculoskeletal: Positive for arthralgias. Negative for back pain.   Skin: Negative for rash.   Neurological: Negative for weakness, light-headedness and numbness.   Hematological: Does not bruise/bleed easily.   Psychiatric/Behavioral: The patient is nervous/anxious.        Physical Exam     Initial Vitals [12/23/19 1344]   BP Pulse Resp Temp SpO2   (!) 181/114 86 18 98.4 °F (36.9 °C) 98 %      MAP       --         Physical Exam    Nursing note and vitals reviewed.  Constitutional: She appears well-developed and well-nourished. She is not diaphoretic. No distress.   HENT:   Head: Normocephalic and atraumatic.   Eyes: EOM are normal. Pupils are equal, round, and reactive to light.   Neck: Normal range of motion.   Cardiovascular: Normal rate, regular rhythm, normal heart sounds and intact distal pulses. Exam reveals no gallop and no friction rub.    No murmur heard.  Pulmonary/Chest: Breath sounds normal. No respiratory distress. She has no wheezes. She has no rhonchi. She has no rales. She exhibits no tenderness.   Abdominal: Soft. Bowel sounds are normal. She exhibits no distension and no mass. There is no tenderness. There is no rebound and no guarding.   Musculoskeletal: Normal range of motion.   Neurological: She is alert and oriented to person, place, and time.   Skin: Skin is warm and dry.   Psychiatric: She has a normal mood and affect.         ED Course   Procedures  Labs Reviewed   CBC W/ AUTO DIFFERENTIAL - Abnormal; Notable for the following components:       Result Value    Hemoglobin 11.8 (*)     Hematocrit 36.7 (*)     Gran% 78.8 (*)     Lymph% 13.3 (*)     All other components within normal limits   COMPREHENSIVE METABOLIC PANEL - Abnormal; Notable for the following components:    Calcium 10.6 (*)     All other components within normal limits   TROPONIN I   B-TYPE NATRIURETIC PEPTIDE   TROPONIN I     EKG Readings: (Independently Interpreted)    Initial Reading: No STEMI. Rhythm: Normal Sinus Rhythm. Heart Rate: 75. Ectopy: No Ectopy. ST Segments: Normal ST Segments. T Waves: Normal. Clinical Impression: Normal Sinus Rhythm     ECG Results          EKG 12-lead (In process)  Result time 12/23/19 13:52:41    In process by Interface, Lab In [facility] (12/23/19 13:52:41)                 Narrative:    Test Reason : R07.9,    Vent. Rate : 075 BPM     Atrial Rate : 075 BPM     P-R Int : 124 ms          QRS Dur : 092 ms      QT Int : 364 ms       P-R-T Axes : 077 074 045 degrees     QTc Int : 406 ms    Normal sinus rhythm  Incomplete right bundle branch block  Borderline Abnormal ECG  No previous ECGs available    Referred By:             Confirmed By:                             Imaging Results          X-Ray Chest PA And Lateral (Final result)  Result time 12/23/19 14:39:51    Final result by Anum Walsh MD (12/23/19 14:39:51)                 Impression:      No source for chest pain identified.    If there is concern for a small amount of dependent right pleural fluid, right lateral decubitus chest radiograph might provide additional information.    If there is concern for intrathoracic disease below the limits of detection with chest radiograph, especially in light of the patient's history of tobacco consumption, chest CT might provide further information.      Electronically signed by: Anum Walsh MD  Date:    12/23/2019  Time:    14:39             Narrative:    EXAMINATION:  XR CHEST PA AND LATERAL    CLINICAL HISTORY:  Chest Pain;    TECHNIQUE:  PA and lateral views of the chest were performed.    COMPARISON:  None    FINDINGS:  Mediastinal structures are midline. Cardiac silhouette and pulmonary vascular distribution are normal.    Lungs are deeply and symmetrically inflated without convincing evidence of overinflation.    There is mild blunting of the right posterior costophrenic angle with no blunting of the right lateral costophrenic angle.  If  there is concern for right pleural fluid, right lateral decubitus radiograph might provide more sensitive assessment.    I detect no focal pulmonary disease lymph node enlargement, cardiac decompensation, pneumothorax, pneumomediastinum, pneumoperitoneum or significant osseous abnormality.                                 Medical Decision Making:   History:   Old Medical Records: I decided to obtain old medical records.  Old Records Summarized: records from clinic visits.       <> Summary of Records: No recent ED visits or admissions  Initial Assessment:   59-year-old female presenting for several hour episode of chest pain which resolved prior to arrival.  On ED arrival, she is hypertensive 181/114 with otherwise normal vitals.  She appears anxious but is in no acute distress  Differential Diagnosis:   Anxiety  Muscle strain  ACS  GERD  Independently Interpreted Test(s):   I have ordered and independently interpreted X-rays - see summary below.       <> Summary of X-Ray Reading(s): No consolidation or pulmonary edema  I have ordered and independently interpreted EKG Reading(s) - see prior notes  Clinical Tests:   Lab Tests: Ordered and Reviewed  Radiological Study: Ordered and Reviewed  Medical Tests: Ordered and Reviewed  ED Management:  Will check labs, do chest x-ray and reassess.    Lab workup is unremarkable. Troponin negative.  Given constant symptoms for 6 hr, do not feel that checking 2nd troponin is indicated.  Given low HEART score of 2 will discharge and instruct patient to follow up with PCP for outpatient stress testing. Stressed the importance of follow-up, strict ED return precautions given.  Patient voiced understanding and is comfortable with discharge. I discussed this patient with my supervising physician.    Leyla Slaughter PA-C      Additional MDM:   Heart Score:    History:          Slightly suspicious.  ECG:             Normal  Age:               45-65 years  Risk factors: 1-2 risk  factors  Troponin:       Less than or equal to normal limit  Final Score: 2                    ED Course as of Dec 23 1700   Mon Dec 23, 2019   1501 Mild anemia   CBC auto differential(!) [CC]   1502 Troponin negative   Troponin I #1 [CC]   1502 No acute abnormalities   X-Ray Chest PA And Lateral [CC]      ED Course User Index  [CC] Leyla Slaughter PA-C                Clinical Impression:       ICD-10-CM ICD-9-CM   1. Chest pain R07.9 786.50         Disposition:   Disposition: Discharged  Condition: Stable                     Leyla Slaughter PA-C  12/23/19 1703

## 2020-03-05 ENCOUNTER — TELEPHONE (OUTPATIENT)
Dept: INTERNAL MEDICINE | Facility: CLINIC | Age: 60
End: 2020-03-05

## 2020-03-05 NOTE — TELEPHONE ENCOUNTER
LMOV for pt to return my call.  Need to know if she has an urgent issue that needs to be addressed sooner than first available.    If so, could offer appt with another doctor.

## 2020-03-05 NOTE — TELEPHONE ENCOUNTER
----- Message from Erin Pinzon sent at 3/5/2020 12:30 PM CST -----  Contact: self [phone]  Caller is requesting an earlier appt than we can schedule.  Caller declined first available appointment listed below. Caller will not accept being placed on the wait list and is requesting a message be sent to the provider.  When is the next available appointment:  Medicaid patient  Did you offer to schedule the next available appt and put the patient on the wait list?:  no   What visit type: Physical  Symptoms:    Patient preference of timeframe to be scheduled:  no  What is the reason the patient is requesting a sooner appointment? (insurance terminating, changing jobs):    Would the patient rather a call back or a response via [facility]?:  Call back  Comments:

## 2020-03-13 ENCOUNTER — TELEPHONE (OUTPATIENT)
Dept: INTERNAL MEDICINE | Facility: CLINIC | Age: 60
End: 2020-03-13

## 2020-03-13 DIAGNOSIS — Z00.00 ROUTINE GENERAL MEDICAL EXAMINATION AT A HEALTH CARE FACILITY: Primary | ICD-10-CM

## 2020-03-13 NOTE — TELEPHONE ENCOUNTER
----- Message from Rylee Mckenzie sent at 3/13/2020 12:13 PM CDT -----  Contact: self/ [phone]  Patient is returning a phone call.  Who left a message for the patient:   Does patient know what this is regarding:    Comments: need her annual schedule  and she need her labs to go to [facility]

## 2020-06-01 ENCOUNTER — PATIENT OUTREACH (OUTPATIENT)
Dept: ADMINISTRATIVE | Facility: HOSPITAL | Age: 60
End: 2020-06-01

## 2020-06-01 DIAGNOSIS — Z12.11 COLON CANCER SCREENING: Primary | ICD-10-CM

## 2020-06-01 DIAGNOSIS — Z12.31 ENCOUNTER FOR SCREENING MAMMOGRAM FOR BREAST CANCER: ICD-10-CM

## 2020-08-07 ENCOUNTER — PATIENT OUTREACH (OUTPATIENT)
Dept: ADMINISTRATIVE | Facility: HOSPITAL | Age: 60
End: 2020-08-07

## 2020-08-07 NOTE — PROGRESS NOTES
Patient overdue for colorectal cancer screening. Called patient to see if patient would like fit kit place in mail. Unable to leave voice message.

## 2021-01-12 ENCOUNTER — TELEPHONE (OUTPATIENT)
Dept: INTERNAL MEDICINE | Facility: CLINIC | Age: 61
End: 2021-01-12

## 2021-01-12 DIAGNOSIS — Z00.00 ROUTINE GENERAL MEDICAL EXAMINATION AT A HEALTH CARE FACILITY: Primary | ICD-10-CM

## 2021-01-22 LAB
ALBUMIN SERPL-MCNC: 4.3 G/DL (ref 3.6–5.1)
ALBUMIN/GLOB SERPL: 1.5 (CALC) (ref 1–2.5)
ALP SERPL-CCNC: 66 U/L (ref 37–153)
ALT SERPL-CCNC: 20 U/L (ref 6–29)
APPEARANCE UR: CLEAR
AST SERPL-CCNC: 18 U/L (ref 10–35)
BASOPHILS # BLD AUTO: 41 CELLS/UL (ref 0–200)
BASOPHILS NFR BLD AUTO: 0.9 %
BILIRUB SERPL-MCNC: 0.4 MG/DL (ref 0.2–1.2)
BILIRUB UR QL STRIP: NEGATIVE
BUN SERPL-MCNC: 13 MG/DL (ref 7–25)
BUN/CREAT SERPL: NORMAL (CALC) (ref 6–22)
CALCIUM SERPL-MCNC: 9.8 MG/DL (ref 8.6–10.4)
CHLORIDE SERPL-SCNC: 105 MMOL/L (ref 98–110)
CHOLEST SERPL-MCNC: 272 MG/DL
CHOLEST/HDLC SERPL: 5.2 (CALC)
CO2 SERPL-SCNC: 29 MMOL/L (ref 20–32)
COLOR UR: YELLOW
CREAT SERPL-MCNC: 0.76 MG/DL (ref 0.5–0.99)
EOSINOPHIL # BLD AUTO: 113 CELLS/UL (ref 15–500)
EOSINOPHIL NFR BLD AUTO: 2.5 %
ERYTHROCYTE [DISTWIDTH] IN BLOOD BY AUTOMATED COUNT: 12.9 % (ref 11–15)
GFRSERPLBLD MDRD-ARVRAT: 85 ML/MIN/1.73M2
GLOBULIN SER CALC-MCNC: 2.9 G/DL (CALC) (ref 1.9–3.7)
GLUCOSE SERPL-MCNC: 95 MG/DL (ref 65–99)
GLUCOSE UR QL STRIP: NEGATIVE
HBA1C MFR BLD: 5.2 % OF TOTAL HGB
HCT VFR BLD AUTO: 37.9 % (ref 35–45)
HDLC SERPL-MCNC: 52 MG/DL
HGB BLD-MCNC: 12.4 G/DL (ref 11.7–15.5)
HGB UR QL STRIP: NEGATIVE
KETONES UR QL STRIP: ABNORMAL
LDLC SERPL CALC-MCNC: 184 MG/DL (CALC)
LEUKOCYTE ESTERASE UR QL STRIP: NEGATIVE
LYMPHOCYTES # BLD AUTO: 1386 CELLS/UL (ref 850–3900)
LYMPHOCYTES NFR BLD AUTO: 30.8 %
MCH RBC QN AUTO: 29.3 PG (ref 27–33)
MCHC RBC AUTO-ENTMCNC: 32.7 G/DL (ref 32–36)
MCV RBC AUTO: 89.6 FL (ref 80–100)
MONOCYTES # BLD AUTO: 414 CELLS/UL (ref 200–950)
MONOCYTES NFR BLD AUTO: 9.2 %
NEUTROPHILS # BLD AUTO: 2547 CELLS/UL (ref 1500–7800)
NEUTROPHILS NFR BLD AUTO: 56.6 %
NITRITE UR QL STRIP: NEGATIVE
NONHDLC SERPL-MCNC: 220 MG/DL (CALC)
PH UR STRIP: 5.5 [PH] (ref 5–8)
PLATELET # BLD AUTO: 289 THOUSAND/UL (ref 140–400)
PMV BLD REES-ECKER: 10.2 FL (ref 7.5–12.5)
POTASSIUM SERPL-SCNC: 4.2 MMOL/L (ref 3.5–5.3)
PROT SERPL-MCNC: 7.2 G/DL (ref 6.1–8.1)
PROT UR QL STRIP: NEGATIVE
RBC # BLD AUTO: 4.23 MILLION/UL (ref 3.8–5.1)
SODIUM SERPL-SCNC: 142 MMOL/L (ref 135–146)
SP GR UR STRIP: 1.03 (ref 1–1.03)
TRIGL SERPL-MCNC: 187 MG/DL
TSH SERPL-ACNC: 3.64 MIU/L (ref 0.4–4.5)
WBC # BLD AUTO: 4.5 THOUSAND/UL (ref 3.8–10.8)

## 2021-01-26 ENCOUNTER — OFFICE VISIT (OUTPATIENT)
Dept: INTERNAL MEDICINE | Facility: CLINIC | Age: 61
End: 2021-01-26
Payer: MEDICAID

## 2021-01-26 ENCOUNTER — TELEPHONE (OUTPATIENT)
Dept: INTERNAL MEDICINE | Facility: CLINIC | Age: 61
End: 2021-01-26

## 2021-01-26 VITALS
BODY MASS INDEX: 23.37 KG/M2 | HEIGHT: 60 IN | HEART RATE: 98 BPM | WEIGHT: 119.06 LBS | TEMPERATURE: 98 F | DIASTOLIC BLOOD PRESSURE: 84 MMHG | SYSTOLIC BLOOD PRESSURE: 120 MMHG

## 2021-01-26 DIAGNOSIS — R31.9 HEMATURIA, UNSPECIFIED TYPE: Primary | ICD-10-CM

## 2021-01-26 DIAGNOSIS — Z12.31 VISIT FOR SCREENING MAMMOGRAM: ICD-10-CM

## 2021-01-26 DIAGNOSIS — Z78.0 POSTMENOPAUSAL STATUS: ICD-10-CM

## 2021-01-26 DIAGNOSIS — Z00.00 ROUTINE MEDICAL EXAM: Primary | ICD-10-CM

## 2021-01-26 PROCEDURE — 99999 PR PBB SHADOW E&M-EST. PATIENT-LVL IV: ICD-10-PCS | Mod: PBBFAC,,, | Performed by: INTERNAL MEDICINE

## 2021-01-26 PROCEDURE — 93010 ELECTROCARDIOGRAM REPORT: CPT | Mod: S$PBB,,, | Performed by: INTERNAL MEDICINE

## 2021-01-26 PROCEDURE — 93010 EKG 12-LEAD: ICD-10-PCS | Mod: S$PBB,,, | Performed by: INTERNAL MEDICINE

## 2021-01-26 PROCEDURE — 93005 ELECTROCARDIOGRAM TRACING: CPT | Mod: PBBFAC,PO | Performed by: INTERNAL MEDICINE

## 2021-01-26 PROCEDURE — 99396 PREV VISIT EST AGE 40-64: CPT | Mod: S$PBB,,, | Performed by: INTERNAL MEDICINE

## 2021-01-26 PROCEDURE — 99999 PR PBB SHADOW E&M-EST. PATIENT-LVL IV: CPT | Mod: PBBFAC,,, | Performed by: INTERNAL MEDICINE

## 2021-01-26 PROCEDURE — 99214 OFFICE O/P EST MOD 30 MIN: CPT | Mod: PBBFAC,PO | Performed by: INTERNAL MEDICINE

## 2021-01-26 PROCEDURE — 99396 PR PREVENTIVE VISIT,EST,40-64: ICD-10-PCS | Mod: S$PBB,,, | Performed by: INTERNAL MEDICINE

## 2021-01-26 RX ORDER — ATORVASTATIN CALCIUM 10 MG/1
10 TABLET, FILM COATED ORAL DAILY
Qty: 30 TABLET | Refills: 6 | Status: SHIPPED | OUTPATIENT
Start: 2021-01-26 | End: 2021-07-09

## 2021-02-12 ENCOUNTER — HOSPITAL ENCOUNTER (OUTPATIENT)
Dept: RADIOLOGY | Facility: HOSPITAL | Age: 61
Discharge: HOME | End: 2021-02-12
Attending: INTERNAL MEDICINE
Payer: MEDICAID

## 2021-02-12 DIAGNOSIS — Z12.31 VISIT FOR SCREENING MAMMOGRAM: ICD-10-CM

## 2021-02-12 DIAGNOSIS — Z78.0 POSTMENOPAUSAL STATUS: ICD-10-CM

## 2021-02-12 DIAGNOSIS — Z00.00 ROUTINE MEDICAL EXAM: ICD-10-CM

## 2021-02-12 PROCEDURE — 77080 DXA BONE DENSITY AXIAL: CPT | Mod: TC

## 2021-02-12 PROCEDURE — 77067 SCR MAMMO BI INCL CAD: CPT | Mod: 26,,, | Performed by: RADIOLOGY

## 2021-02-12 PROCEDURE — 77063 MAMMO DIGITAL SCREENING BILAT WITH TOMO: ICD-10-PCS | Mod: 26,,, | Performed by: RADIOLOGY

## 2021-02-12 PROCEDURE — 77080 DEXA BONE DENSITY SPINE HIP: ICD-10-PCS | Mod: 26,,, | Performed by: RADIOLOGY

## 2021-02-12 PROCEDURE — 77063 BREAST TOMOSYNTHESIS BI: CPT | Mod: 26,,, | Performed by: RADIOLOGY

## 2021-02-12 PROCEDURE — 77067 MAMMO DIGITAL SCREENING BILAT WITH TOMO: ICD-10-PCS | Mod: 26,,, | Performed by: RADIOLOGY

## 2021-02-12 PROCEDURE — 77067 SCR MAMMO BI INCL CAD: CPT | Mod: TC

## 2021-02-12 PROCEDURE — 77080 DXA BONE DENSITY AXIAL: CPT | Mod: 26,,, | Performed by: RADIOLOGY

## 2021-03-08 ENCOUNTER — PATIENT MESSAGE (OUTPATIENT)
Dept: ADMINISTRATIVE | Facility: HOSPITAL | Age: 61
End: 2021-03-08

## 2021-03-10 ENCOUNTER — PATIENT OUTREACH (OUTPATIENT)
Dept: ADMINISTRATIVE | Facility: OTHER | Age: 61
End: 2021-03-10

## 2021-03-12 ENCOUNTER — OFFICE VISIT (OUTPATIENT)
Dept: OBSTETRICS AND GYNECOLOGY | Facility: CLINIC | Age: 61
End: 2021-03-12
Payer: MEDICAID

## 2021-03-12 VITALS — WEIGHT: 117 LBS | DIASTOLIC BLOOD PRESSURE: 70 MMHG | SYSTOLIC BLOOD PRESSURE: 140 MMHG | BODY MASS INDEX: 23.24 KG/M2

## 2021-03-12 DIAGNOSIS — Z12.4 ROUTINE PAPANICOLAOU SMEAR: Primary | ICD-10-CM

## 2021-03-12 DIAGNOSIS — Z01.419 WELL WOMAN EXAM WITH ROUTINE GYNECOLOGICAL EXAM: ICD-10-CM

## 2021-03-12 DIAGNOSIS — Z00.00 ROUTINE MEDICAL EXAM: ICD-10-CM

## 2021-03-12 PROCEDURE — 88175 CYTOPATH C/V AUTO FLUID REDO: CPT | Performed by: OBSTETRICS & GYNECOLOGY

## 2021-03-12 PROCEDURE — 99213 OFFICE O/P EST LOW 20 MIN: CPT | Mod: PBBFAC,PO | Performed by: OBSTETRICS & GYNECOLOGY

## 2021-03-12 PROCEDURE — 99999 PR PBB SHADOW E&M-EST. PATIENT-LVL III: CPT | Mod: PBBFAC,,, | Performed by: OBSTETRICS & GYNECOLOGY

## 2021-03-12 PROCEDURE — 99386 PR PREVENTIVE VISIT,NEW,40-64: ICD-10-PCS | Mod: S$PBB,,, | Performed by: OBSTETRICS & GYNECOLOGY

## 2021-03-12 PROCEDURE — 99999 PR PBB SHADOW E&M-EST. PATIENT-LVL III: ICD-10-PCS | Mod: PBBFAC,,, | Performed by: OBSTETRICS & GYNECOLOGY

## 2021-03-12 PROCEDURE — 99386 PREV VISIT NEW AGE 40-64: CPT | Mod: S$PBB,,, | Performed by: OBSTETRICS & GYNECOLOGY

## 2021-03-12 RX ORDER — PRAVASTATIN SODIUM 20 MG/1
TABLET ORAL
COMMUNITY
Start: 2021-03-05 | End: 2021-07-08 | Stop reason: SDUPTHER

## 2021-03-19 ENCOUNTER — PATIENT OUTREACH (OUTPATIENT)
Dept: ADMINISTRATIVE | Facility: HOSPITAL | Age: 61
End: 2021-03-19

## 2021-03-23 LAB
FINAL PATHOLOGIC DIAGNOSIS: NORMAL
Lab: NORMAL

## 2021-04-05 ENCOUNTER — PATIENT MESSAGE (OUTPATIENT)
Dept: ADMINISTRATIVE | Facility: HOSPITAL | Age: 61
End: 2021-04-05

## 2021-04-15 ENCOUNTER — PATIENT MESSAGE (OUTPATIENT)
Dept: RESEARCH | Facility: HOSPITAL | Age: 61
End: 2021-04-15

## 2021-05-13 ENCOUNTER — PATIENT OUTREACH (OUTPATIENT)
Dept: ADMINISTRATIVE | Facility: HOSPITAL | Age: 61
End: 2021-05-13

## 2021-05-13 DIAGNOSIS — Z12.11 COLON CANCER SCREENING: Primary | ICD-10-CM

## 2021-07-01 RX ORDER — PRAVASTATIN SODIUM 20 MG/1
20 TABLET ORAL NIGHTLY
Qty: 90 TABLET | Refills: 3 | OUTPATIENT
Start: 2021-07-01

## 2021-07-06 ENCOUNTER — PATIENT MESSAGE (OUTPATIENT)
Dept: ADMINISTRATIVE | Facility: HOSPITAL | Age: 61
End: 2021-07-06

## 2021-07-08 DIAGNOSIS — E78.5 HYPERLIPIDEMIA, UNSPECIFIED HYPERLIPIDEMIA TYPE: Primary | ICD-10-CM

## 2021-07-09 RX ORDER — PRAVASTATIN SODIUM 20 MG/1
20 TABLET ORAL NIGHTLY
Qty: 30 TABLET | Refills: 6 | Status: SHIPPED | OUTPATIENT
Start: 2021-07-09 | End: 2021-12-10 | Stop reason: SDUPTHER

## 2021-07-26 ENCOUNTER — PATIENT MESSAGE (OUTPATIENT)
Dept: ADMINISTRATIVE | Facility: HOSPITAL | Age: 61
End: 2021-07-26

## 2021-07-26 ENCOUNTER — PATIENT OUTREACH (OUTPATIENT)
Dept: ADMINISTRATIVE | Facility: HOSPITAL | Age: 61
End: 2021-07-26

## 2021-08-14 ENCOUNTER — PATIENT MESSAGE (OUTPATIENT)
Dept: ADMINISTRATIVE | Facility: HOSPITAL | Age: 61
End: 2021-08-14

## 2021-10-04 ENCOUNTER — PATIENT MESSAGE (OUTPATIENT)
Dept: ADMINISTRATIVE | Facility: HOSPITAL | Age: 61
End: 2021-10-04

## 2021-12-10 DIAGNOSIS — Z00.00 ROUTINE MEDICAL EXAM: Primary | ICD-10-CM

## 2021-12-10 RX ORDER — PRAVASTATIN SODIUM 20 MG/1
20 TABLET ORAL NIGHTLY
Qty: 30 TABLET | Refills: 6 | Status: SHIPPED | OUTPATIENT
Start: 2021-12-10 | End: 2022-01-28

## 2021-12-10 RX ORDER — FLUCONAZOLE 150 MG/1
150 TABLET ORAL DAILY
Qty: 1 TABLET | Refills: 0 | Status: SHIPPED | OUTPATIENT
Start: 2021-12-10 | End: 2021-12-11

## 2021-12-10 RX ORDER — AZITHROMYCIN 250 MG/1
TABLET, FILM COATED ORAL
Qty: 6 TABLET | Refills: 0 | Status: SHIPPED | OUTPATIENT
Start: 2021-12-10 | End: 2021-12-15

## 2022-01-18 LAB
ALBUMIN: 4.8 GRAM/DL (ref 3.5–5)
ALP SERPL-CCNC: 63 UNIT/L (ref 38–126)
ALT SERPL W P-5'-P-CCNC: 21 UNIT/L (ref 7–56)
ANION GAP SERPL CALC-SCNC: 19 MEQ/L (ref 9–18)
AST SERPL-CCNC: 25 UNIT/L (ref 7–40)
BASOPHILS ABSOLUTE COUNT: 0.1 K/UL (ref 0–0.2)
BASOPHILS NFR BLD: 0.9 % (ref 0–2)
BILIRUB SERPL-MCNC: 0.3 MG/DL (ref 0–1.2)
BUN BLD-MCNC: 9 MG/DL (ref 7–21)
BUN/CREAT SERPL: 11 RATIO (ref 6–22)
CALC OSMOLALITY: 282 MOSM/KG (ref 275–295)
CALCIUM SERPL-MCNC: 10.6 MG/DL (ref 8.5–10.3)
CHLORIDE SERPL-SCNC: 103 MEQ/L (ref 98–107)
CHOL/HDLC RATIO: 5
CHOLEST SERPL-MSCNC: 261 MG/DL (ref 100–200)
CO2 SERPL-SCNC: 25 MEQ/L (ref 21–31)
CREAT SERPL-MCNC: 0.8 MG/DL (ref 0.5–1)
DIFFERENTIAL TYPE: NORMAL
EOSINOPHIL NFR BLD: 1.6 % (ref 0–4)
EOSINOPHILS ABSOLUTE COUNT: 0.1 K/UL (ref 0–0.7)
ERYTHROCYTE [DISTWIDTH] IN BLOOD BY AUTOMATED COUNT: 13.8 % (ref 12–15.3)
GFR: 70.9 ML/MIN/1.73M2
GLUCOSE SERPL-MCNC: 94 MG/DL (ref 70–100)
HBA1C MFR BLD: 5.3 % (ref 4.5–5.7)
HCT VFR BLD AUTO: 38 % (ref 37–47)
HDLC SERPL-MCNC: 54 MG/DL (ref 40–75)
HGB BLD-MCNC: 12.8 GRAM/DL (ref 12–16)
LDLC SERPL CALC-MCNC: 183 MG/DL (ref 0–125)
LYMPHOCYTES %: 19.2 % (ref 15–45)
LYMPHOCYTES ABSOLUTE COUNT: 1.1 K/UL (ref 1–4.2)
MCH RBC QN AUTO: 29.7 PICOGRAM (ref 27–33)
MCHC RBC AUTO-ENTMCNC: 33.6 GRAM/DL (ref 32–36)
MCV RBC AUTO: 88.3 FEMTOLITER (ref 81–99)
MONOCYTES %: 6.7 % (ref 3–13)
MONOCYTES ABSOLUTE COUNT: 0.4 K/UL (ref 0.1–0.8)
NEUTROPHILS ABSOLUTE COUNT: 4.2 K/UL (ref 2.1–7.6)
NEUTROPHILS RELATIVE PERCENT: 71.6 % (ref 32–80)
NONHDLC SERPL-MCNC: 207 MG/DL (ref 60–125)
PLATELET # BLD AUTO: 272 K/UL (ref 150–350)
PMV BLD AUTO: 8.6 FEMTOLITER (ref 7–10.2)
POTASSIUM SERPL-SCNC: 4.7 MEQ/L (ref 3.5–5)
RBC # BLD AUTO: 4.3 MIL/UL (ref 4.2–5.4)
SODIUM BLD-SCNC: 142 MEQ/L (ref 135–145)
TOTAL PROTEIN: 7.7 GRAM/DL (ref 6.3–8.2)
TRIGL SERPL-MCNC: 151 MG/DL (ref 30–150)
TSH SERPL DL<=0.005 MIU/L-ACNC: 2.72 UIL/ML (ref 0.35–4)
WBC # BLD AUTO: 5.9 K/UL (ref 4.5–11)

## 2022-01-19 LAB
APPEARANCE UR: CLEAR
BILIRUB UR QL STRIP: NEGATIVE
COLOR UR: YELLOW
GLUCOSE UR QL STRIP: NEGATIVE
HGB UR QL STRIP: NEGATIVE
KETONES UR QL STRIP: NEGATIVE
LEUKOCYTE ESTERASE UR QL STRIP: NEGATIVE
NITRITE UR QL STRIP: NEGATIVE
PH UR STRIP: 5.5 [PH] (ref 5–8)
PROT UR QL STRIP: NEGATIVE
SP GR UR STRIP: 1.02 (ref 1–1.03)

## 2022-01-28 ENCOUNTER — OFFICE VISIT (OUTPATIENT)
Dept: INTERNAL MEDICINE | Facility: CLINIC | Age: 62
End: 2022-01-28
Payer: MEDICAID

## 2022-01-28 VITALS
SYSTOLIC BLOOD PRESSURE: 132 MMHG | BODY MASS INDEX: 22.16 KG/M2 | DIASTOLIC BLOOD PRESSURE: 86 MMHG | OXYGEN SATURATION: 98 % | HEIGHT: 60 IN | HEART RATE: 66 BPM | RESPIRATION RATE: 18 BRPM | TEMPERATURE: 98 F | WEIGHT: 112.88 LBS

## 2022-01-28 DIAGNOSIS — E78.5 HYPERLIPIDEMIA, UNSPECIFIED HYPERLIPIDEMIA TYPE: Primary | ICD-10-CM

## 2022-01-28 DIAGNOSIS — Z00.00 ROUTINE GENERAL MEDICAL EXAMINATION AT A HEALTH CARE FACILITY: Primary | ICD-10-CM

## 2022-01-28 DIAGNOSIS — Z12.31 VISIT FOR SCREENING MAMMOGRAM: ICD-10-CM

## 2022-01-28 PROCEDURE — 3079F DIAST BP 80-89 MM HG: CPT | Mod: CPTII,S$PBB,, | Performed by: INTERNAL MEDICINE

## 2022-01-28 PROCEDURE — 1159F PR MEDICATION LIST DOCUMENTED IN MEDICAL RECORD: ICD-10-PCS | Mod: CPTII,S$PBB,, | Performed by: INTERNAL MEDICINE

## 2022-01-28 PROCEDURE — 99999 PR PBB SHADOW E&M-EST. PATIENT-LVL IV: CPT | Mod: PBBFAC,,, | Performed by: INTERNAL MEDICINE

## 2022-01-28 PROCEDURE — 3044F PR MOST RECENT HEMOGLOBIN A1C LEVEL <7.0%: ICD-10-PCS | Mod: CPTII,S$PBB,, | Performed by: INTERNAL MEDICINE

## 2022-01-28 PROCEDURE — 3075F SYST BP GE 130 - 139MM HG: CPT | Mod: CPTII,S$PBB,, | Performed by: INTERNAL MEDICINE

## 2022-01-28 PROCEDURE — 3079F PR MOST RECENT DIASTOLIC BLOOD PRESSURE 80-89 MM HG: ICD-10-PCS | Mod: CPTII,S$PBB,, | Performed by: INTERNAL MEDICINE

## 2022-01-28 PROCEDURE — 99396 PREV VISIT EST AGE 40-64: CPT | Mod: S$PBB,,, | Performed by: INTERNAL MEDICINE

## 2022-01-28 PROCEDURE — 99214 OFFICE O/P EST MOD 30 MIN: CPT | Mod: PBBFAC,PO | Performed by: INTERNAL MEDICINE

## 2022-01-28 PROCEDURE — 99396 PR PREVENTIVE VISIT,EST,40-64: ICD-10-PCS | Mod: S$PBB,,, | Performed by: INTERNAL MEDICINE

## 2022-01-28 PROCEDURE — 3075F PR MOST RECENT SYSTOLIC BLOOD PRESS GE 130-139MM HG: ICD-10-PCS | Mod: CPTII,S$PBB,, | Performed by: INTERNAL MEDICINE

## 2022-01-28 PROCEDURE — 3008F PR BODY MASS INDEX (BMI) DOCUMENTED: ICD-10-PCS | Mod: CPTII,S$PBB,, | Performed by: INTERNAL MEDICINE

## 2022-01-28 PROCEDURE — 1160F PR REVIEW ALL MEDS BY PRESCRIBER/CLIN PHARMACIST DOCUMENTED: ICD-10-PCS | Mod: CPTII,S$PBB,, | Performed by: INTERNAL MEDICINE

## 2022-01-28 PROCEDURE — 3008F BODY MASS INDEX DOCD: CPT | Mod: CPTII,S$PBB,, | Performed by: INTERNAL MEDICINE

## 2022-01-28 PROCEDURE — 1159F MED LIST DOCD IN RCRD: CPT | Mod: CPTII,S$PBB,, | Performed by: INTERNAL MEDICINE

## 2022-01-28 PROCEDURE — 1160F RVW MEDS BY RX/DR IN RCRD: CPT | Mod: CPTII,S$PBB,, | Performed by: INTERNAL MEDICINE

## 2022-01-28 PROCEDURE — 3044F HG A1C LEVEL LT 7.0%: CPT | Mod: CPTII,S$PBB,, | Performed by: INTERNAL MEDICINE

## 2022-01-28 PROCEDURE — 99999 PR PBB SHADOW E&M-EST. PATIENT-LVL IV: ICD-10-PCS | Mod: PBBFAC,,, | Performed by: INTERNAL MEDICINE

## 2022-01-28 RX ORDER — ERGOCALCIFEROL 1.25 MG/1
50000 CAPSULE ORAL
Qty: 4 CAPSULE | Refills: 3 | Status: SHIPPED | OUTPATIENT
Start: 2022-01-28 | End: 2022-05-10

## 2022-01-28 RX ORDER — ZINC OXIDE 13 %
CREAM (GRAM) TOPICAL
COMMUNITY

## 2022-01-28 RX ORDER — ROSUVASTATIN CALCIUM 10 MG/1
10 TABLET, COATED ORAL DAILY
Qty: 30 TABLET | Refills: 11 | Status: SHIPPED | OUTPATIENT
Start: 2022-01-28 | End: 2022-04-30

## 2022-01-28 RX ORDER — ROSUVASTATIN CALCIUM 10 MG/1
10 TABLET, COATED ORAL DAILY
Qty: 90 TABLET | Refills: 3 | Status: SHIPPED | OUTPATIENT
Start: 2022-01-28 | End: 2022-01-28

## 2022-01-28 NOTE — PROGRESS NOTES
The patient is a 61 y.o. old female who presents to the office for a physical.  Review of labs reveals elevated cholesterol.    PAST MEDICAL HISTORY  Past Medical History:   Diagnosis Date    Hyperlipidemia 3/26/2013       SURGICAL HISTORY:  Past Surgical History:   Procedure Laterality Date    ADENOIDECTOMY      LASIK      TONSILLECTOMY           MEDS:  Medcard reviewed and updated    ALLERGIES: Allergy Card reviewed and updated    SOCIAL HISTORY:   The patient is a nonsmoker, denies alcohol or illicit drug use.    ROS:  GENERAL: No fever, chills, fatigability or weight loss.  SKIN: No rashes.  HEAD: No headaches or recent head trauma.  EYES: No photophobia, ocular pain or diplopia.  EARS: Denies ear pain, discharge or vertigo.  NOSE: No epistaxis or postnasal drip.  MOUTH & THROAT: No hoarseness or change in voice.   NODES: Denies swollen glands.  CHEST: Denies shortness of breath, wheezing, cough and sputum production.  CARDIOVASCULAR: Denies chest pain or palpitations.  ABDOMEN: Appetite fine. Denies diarrhea, abdominal pain, constipation or blood in stool.  URINARY: No dysuria or hematuria.  MUSCULOSKELETAL: No joint stiffness or swelling. Denies back pain.  NEUROLOGIC: No history of seizures.  ENDOCRINE: Denies polyuria or polydipsia.  PSYCHIATRIC: Denies mood swings, depression, anxiety, homicidal or suicidal thoughts.    SCREENINGS:  Last cholesterol: 2022  Last colonoscopy/iFOBT: 2022  Last mammogram: 2021  Last Pap smear: 2021  Last tetanus: 2016  Last Pneumovax: none  Last eye exam: 2021  Last bone density: 2021  Last menstrual period: postmenopausal    PE:   Vitals:  Vitals:    01/28/22 1058   BP: 132/86   Pulse: 66   Resp: 18   Temp: 97.9 °F (36.6 °C)       APPEARANCE: Well nourished, well developed, in no acute distress.    EYES: Sclerae anicteric. PERRL. EOMI.      EARS: TM's intact. No retraction or perforation.    NOSE: Mucosa pink. Airway clear.  MOUTH & THROAT: No tonsillar enlargement. No  pharyngeal erythema or exudate. No stridor.  NECK: Supple, no thyromegaly.  CHEST: Lungs clear to auscultation with unlabored respirations.  CARDIOVASCULAR: Normal S1, S2. No murmurs. No carotid bruits. No pedal edema.  ABDOMEN: Bowel sounds normal. Not distended. Soft. No tenderness or masses. No organomegaly.  MUSCULOSKELETAL:  Normal gait, no cyanosis or clubbing.   SKIN: Normal skin turgor, warm and dry.  NEUROLOGIC: Cranial Nerves: Intact.  PSYCHIATRIC: The patient is oriented to person, place, and time and has a pleasant affect.        ASSESSMENT/PLAN:  Laura was seen today for annual exam.    Diagnoses and all orders for this visit:    Routine general medical examination at a health care facility  -     Comprehensive Metabolic Panel; Future  -     Lipid Panel; Future    Visit for screening mammogram  -     Mammo Digital Screening Bilat; Future    Other orders  -     rosuvastatin (CRESTOR) 10 MG tablet; Take 1 tablet (10 mg total) by mouth once daily.

## 2022-02-01 ENCOUNTER — TELEPHONE (OUTPATIENT)
Dept: INTERNAL MEDICINE | Facility: CLINIC | Age: 62
End: 2022-02-01
Payer: MEDICAID

## 2022-02-08 ENCOUNTER — TELEPHONE (OUTPATIENT)
Dept: INTERNAL MEDICINE | Facility: CLINIC | Age: 62
End: 2022-02-08
Payer: MEDICAID

## 2022-02-08 NOTE — TELEPHONE ENCOUNTER
----- Message from Stan May sent at 2/8/2022  1:09 PM CST -----  No blue slot available to schedule an appointment for the patient.  Patient is established with which PCP: Sigrid  Reason for the visit: follow up   Would the patient like a call back, or a response through their [facility] portal?: call

## 2022-03-02 ENCOUNTER — HOSPITAL ENCOUNTER (OUTPATIENT)
Dept: RADIOLOGY | Facility: HOSPITAL | Age: 62
Discharge: HOME | End: 2022-03-02
Attending: INTERNAL MEDICINE
Payer: MEDICAID

## 2022-03-02 ENCOUNTER — HOSPITAL ENCOUNTER (OUTPATIENT)
Dept: CARDIOLOGY | Facility: HOSPITAL | Age: 62
Discharge: HOME | End: 2022-03-02
Attending: INTERNAL MEDICINE
Payer: MEDICAID

## 2022-03-02 VITALS — WEIGHT: 112 LBS | BODY MASS INDEX: 22.58 KG/M2 | HEIGHT: 59 IN

## 2022-03-02 DIAGNOSIS — E78.5 HYPERLIPIDEMIA, UNSPECIFIED HYPERLIPIDEMIA TYPE: ICD-10-CM

## 2022-03-02 DIAGNOSIS — Z12.31 VISIT FOR SCREENING MAMMOGRAM: ICD-10-CM

## 2022-03-02 LAB
CV STRESS BASE HR: 72 BPM
DIASTOLIC BLOOD PRESSURE: 91 MMHG
OHS CV CPX 1 MINUTE RECOVERY HEART RATE: 133 BPM
OHS CV CPX 85 PERCENT MAX PREDICTED HEART RATE MALE: 129
OHS CV CPX ESTIMATED METS: 12
OHS CV CPX MAX PREDICTED HEART RATE: 152
OHS CV CPX PATIENT IS FEMALE: 1
OHS CV CPX PATIENT IS MALE: 0
OHS CV CPX PEAK DIASTOLIC BLOOD PRESSURE: 88 MMHG
OHS CV CPX PEAK HEAR RATE: 157 BPM
OHS CV CPX PEAK RATE PRESSURE PRODUCT: NORMAL
OHS CV CPX PEAK SYSTOLIC BLOOD PRESSURE: 196 MMHG
OHS CV CPX PERCENT MAX PREDICTED HEART RATE ACHIEVED: 103
OHS CV CPX RATE PRESSURE PRODUCT PRESENTING: 9720
STRESS ECHO POST EXERCISE DUR MIN: 7 MINUTES
STRESS ECHO POST EXERCISE DUR SEC: 29 SECONDS
STRESS ST DEPRESSION: 0.3 MM
SYSTOLIC BLOOD PRESSURE: 135 MMHG

## 2022-03-02 PROCEDURE — 93016 EXERCISE STRESS - EKG (CUPID ONLY): ICD-10-PCS | Mod: ,,, | Performed by: INTERNAL MEDICINE

## 2022-03-02 PROCEDURE — 93016 CV STRESS TEST SUPVJ ONLY: CPT | Mod: ,,, | Performed by: INTERNAL MEDICINE

## 2022-03-02 PROCEDURE — 77063 BREAST TOMOSYNTHESIS BI: CPT | Mod: TC,PO

## 2022-03-02 PROCEDURE — 77063 MAMMO DIGITAL SCREENING BILAT WITH TOMO: ICD-10-PCS | Mod: 26,,, | Performed by: RADIOLOGY

## 2022-03-02 PROCEDURE — 93018 EXERCISE STRESS - EKG (CUPID ONLY): ICD-10-PCS | Mod: ,,, | Performed by: INTERNAL MEDICINE

## 2022-03-02 PROCEDURE — 77067 MAMMO DIGITAL SCREENING BILAT WITH TOMO: ICD-10-PCS | Mod: 26,,, | Performed by: RADIOLOGY

## 2022-03-02 PROCEDURE — 77063 BREAST TOMOSYNTHESIS BI: CPT | Mod: 26,,, | Performed by: RADIOLOGY

## 2022-03-02 PROCEDURE — 93018 CV STRESS TEST I&R ONLY: CPT | Mod: ,,, | Performed by: INTERNAL MEDICINE

## 2022-03-02 PROCEDURE — 93017 CV STRESS TEST TRACING ONLY: CPT

## 2022-03-02 PROCEDURE — 77067 SCR MAMMO BI INCL CAD: CPT | Mod: 26,,, | Performed by: RADIOLOGY

## 2022-03-28 ENCOUNTER — HOSPITAL ENCOUNTER (EMERGENCY)
Facility: HOSPITAL | Age: 62
Discharge: HOME | End: 2022-03-28
Attending: EMERGENCY MEDICINE
Payer: MEDICAID

## 2022-03-28 VITALS
RESPIRATION RATE: 18 BRPM | OXYGEN SATURATION: 98 % | TEMPERATURE: 98 F | WEIGHT: 106 LBS | HEART RATE: 70 BPM | SYSTOLIC BLOOD PRESSURE: 172 MMHG | DIASTOLIC BLOOD PRESSURE: 78 MMHG | BODY MASS INDEX: 21.37 KG/M2 | HEIGHT: 59 IN

## 2022-03-28 DIAGNOSIS — R74.01 TRANSAMINITIS: ICD-10-CM

## 2022-03-28 DIAGNOSIS — K80.20 CALCULUS OF GALLBLADDER WITHOUT CHOLECYSTITIS WITHOUT OBSTRUCTION: ICD-10-CM

## 2022-03-28 DIAGNOSIS — J18.9 PNEUMONIA OF RIGHT MIDDLE LOBE DUE TO INFECTIOUS ORGANISM: Primary | ICD-10-CM

## 2022-03-28 DIAGNOSIS — R07.9 CHEST PAIN: ICD-10-CM

## 2022-03-28 LAB
ALBUMIN SERPL BCP-MCNC: 2.9 G/DL (ref 3.5–5.2)
ALP SERPL-CCNC: 192 U/L (ref 55–135)
ALT SERPL W/O P-5'-P-CCNC: 121 U/L (ref 10–44)
ANION GAP SERPL CALC-SCNC: 10 MMOL/L (ref 8–16)
ANISOCYTOSIS BLD QL SMEAR: SLIGHT
AST SERPL-CCNC: 77 U/L (ref 10–40)
BASOPHILS NFR BLD: 1 % (ref 0–1.9)
BILIRUB SERPL-MCNC: 0.4 MG/DL (ref 0.1–1)
BNP SERPL-MCNC: <10 PG/ML (ref 0–99)
BUN SERPL-MCNC: 11 MG/DL (ref 8–23)
CALCIUM SERPL-MCNC: 10.9 MG/DL (ref 8.7–10.5)
CHLORIDE SERPL-SCNC: 101 MMOL/L (ref 95–110)
CO2 SERPL-SCNC: 27 MMOL/L (ref 23–29)
CREAT SERPL-MCNC: 0.7 MG/DL (ref 0.5–1.4)
D DIMER PPP IA.FEU-MCNC: 2.9 MG/L FEU
DIFFERENTIAL METHOD: ABNORMAL
EOSINOPHIL NFR BLD: 3 % (ref 0–8)
ERYTHROCYTE [DISTWIDTH] IN BLOOD BY AUTOMATED COUNT: 13.9 % (ref 11.5–14.5)
EST. GFR  (AFRICAN AMERICAN): >60 ML/MIN/1.73 M^2
EST. GFR  (NON AFRICAN AMERICAN): >60 ML/MIN/1.73 M^2
GIANT PLATELETS BLD QL SMEAR: PRESENT
GLUCOSE SERPL-MCNC: 97 MG/DL (ref 70–110)
HCT VFR BLD AUTO: 36.4 % (ref 37–48.5)
HCV AB SERPL QL IA: NEGATIVE
HGB BLD-MCNC: 12.1 G/DL (ref 12–16)
HIV 1+2 AB+HIV1 P24 AG SERPL QL IA: NEGATIVE
IMM GRANULOCYTES # BLD AUTO: ABNORMAL K/UL (ref 0–0.04)
IMM GRANULOCYTES NFR BLD AUTO: ABNORMAL % (ref 0–0.5)
LYMPHOCYTES NFR BLD: 20 % (ref 18–48)
MCH RBC QN AUTO: 29 PG (ref 27–31)
MCHC RBC AUTO-ENTMCNC: 33.2 G/DL (ref 32–36)
MCV RBC AUTO: 87 FL (ref 82–98)
METAMYELOCYTES NFR BLD MANUAL: 1 %
MONOCYTES NFR BLD: 6 % (ref 4–15)
NEUTROPHILS NFR BLD: 67 % (ref 38–73)
NEUTS BAND NFR BLD MANUAL: 2 %
NRBC BLD-RTO: 0 /100 WBC
PLATELET # BLD AUTO: 341 K/UL (ref 150–450)
PLATELET BLD QL SMEAR: ABNORMAL
PMV BLD AUTO: 9.8 FL (ref 9.2–12.9)
POTASSIUM SERPL-SCNC: 3.2 MMOL/L (ref 3.5–5.1)
PROT SERPL-MCNC: 8.2 G/DL (ref 6–8.4)
RBC # BLD AUTO: 4.17 M/UL (ref 4–5.4)
SODIUM SERPL-SCNC: 138 MMOL/L (ref 136–145)
TROPONIN I SERPL DL<=0.01 NG/ML-MCNC: 0.01 NG/ML (ref 0–0.03)
WBC # BLD AUTO: 5.57 K/UL (ref 3.9–12.7)

## 2022-03-28 PROCEDURE — 63600175 PHARM REV CODE 636 W HCPCS: Performed by: PHYSICIAN ASSISTANT

## 2022-03-28 PROCEDURE — 85027 COMPLETE CBC AUTOMATED: CPT | Performed by: PHYSICIAN ASSISTANT

## 2022-03-28 PROCEDURE — 85379 FIBRIN DEGRADATION QUANT: CPT | Performed by: PHYSICIAN ASSISTANT

## 2022-03-28 PROCEDURE — 87389 HIV-1 AG W/HIV-1&-2 AB AG IA: CPT | Performed by: EMERGENCY MEDICINE

## 2022-03-28 PROCEDURE — 93010 EKG 12-LEAD: ICD-10-PCS | Mod: ,,, | Performed by: INTERNAL MEDICINE

## 2022-03-28 PROCEDURE — 25500020 PHARM REV CODE 255: Performed by: EMERGENCY MEDICINE

## 2022-03-28 PROCEDURE — 96374 THER/PROPH/DIAG INJ IV PUSH: CPT

## 2022-03-28 PROCEDURE — 93005 ELECTROCARDIOGRAM TRACING: CPT

## 2022-03-28 PROCEDURE — 93010 ELECTROCARDIOGRAM REPORT: CPT | Mod: ,,, | Performed by: INTERNAL MEDICINE

## 2022-03-28 PROCEDURE — 85007 BL SMEAR W/DIFF WBC COUNT: CPT | Performed by: PHYSICIAN ASSISTANT

## 2022-03-28 PROCEDURE — 80053 COMPREHEN METABOLIC PANEL: CPT | Performed by: PHYSICIAN ASSISTANT

## 2022-03-28 PROCEDURE — 99285 EMERGENCY DEPT VISIT HI MDM: CPT | Mod: ,,, | Performed by: PHYSICIAN ASSISTANT

## 2022-03-28 PROCEDURE — 86803 HEPATITIS C AB TEST: CPT | Performed by: EMERGENCY MEDICINE

## 2022-03-28 PROCEDURE — 83880 ASSAY OF NATRIURETIC PEPTIDE: CPT | Performed by: PHYSICIAN ASSISTANT

## 2022-03-28 PROCEDURE — 84484 ASSAY OF TROPONIN QUANT: CPT | Performed by: PHYSICIAN ASSISTANT

## 2022-03-28 PROCEDURE — 99285 PR EMERGENCY DEPT VISIT,LEVEL V: ICD-10-PCS | Mod: ,,, | Performed by: PHYSICIAN ASSISTANT

## 2022-03-28 PROCEDURE — 25000003 PHARM REV CODE 250: Performed by: PHYSICIAN ASSISTANT

## 2022-03-28 PROCEDURE — 99285 EMERGENCY DEPT VISIT HI MDM: CPT | Mod: 25

## 2022-03-28 RX ORDER — KETOROLAC TROMETHAMINE 30 MG/ML
10 INJECTION, SOLUTION INTRAMUSCULAR; INTRAVENOUS
Status: COMPLETED | OUTPATIENT
Start: 2022-03-28 | End: 2022-03-28

## 2022-03-28 RX ORDER — ACETAMINOPHEN 325 MG/1
650 TABLET ORAL
Status: COMPLETED | OUTPATIENT
Start: 2022-03-28 | End: 2022-03-28

## 2022-03-28 RX ORDER — AMOXICILLIN 500 MG/1
1000 CAPSULE ORAL 3 TIMES DAILY
Qty: 30 CAPSULE | Refills: 0 | Status: SHIPPED | OUTPATIENT
Start: 2022-03-28 | End: 2022-04-02

## 2022-03-28 RX ORDER — AZITHROMYCIN 250 MG/1
250 TABLET, FILM COATED ORAL DAILY
Qty: 6 TABLET | Refills: 0 | Status: SHIPPED | OUTPATIENT
Start: 2022-03-28 | End: 2022-05-04

## 2022-03-28 RX ADMIN — IOHEXOL 75 ML: 350 INJECTION, SOLUTION INTRAVENOUS at 12:03

## 2022-03-28 RX ADMIN — ACETAMINOPHEN 650 MG: 325 TABLET ORAL at 11:03

## 2022-03-28 RX ADMIN — KETOROLAC TROMETHAMINE 10 MG: 30 INJECTION, SOLUTION INTRAMUSCULAR at 11:03

## 2022-03-28 NOTE — ED TRIAGE NOTES
Pt reports fatigue x 3 days, Wed, Thu, Fri.  Pt reports rib pain started on Fri, denies injury.  Pt states that the pain increased with deep breath.  Pt reports taking tylenol with mild relief.

## 2022-03-28 NOTE — DISCHARGE INSTRUCTIONS
Your workup today shows evidence of pneumonia in your right middle love. Take antibiotics as prescribed and to completion.   Your liver function tests are elevated. Your ultrasound shows evidence of stones in your gallbladder but no signs of infection in your gallbladder.   Follow up with your primary doctor and general surgery or return to the ER for any new or worsening symptoms.

## 2022-03-28 NOTE — ED NOTES
LOC: The patient is awake, alert and aware of environment with an appropriate affect, the patient is oriented x 3 and speaking appropriately.  APPEARANCE: Patient resting comfortably and in no acute distress, patient is clean and well groomed, patient's clothing is properly fastened.  SKIN: The skin is warm and dry, color consistent with ethnicity, patient has normal skin turgor and moist mucus membranes, skin intact, no breakdown or bruising noted.  MUSCULOSKELETAL: Patient moving all extremities spontaneously, no obvious swelling or deformities noted.  RESPIRATORY: Airway is open and patent, respirations are spontaneous, patient has a normal effort and rate, no accessory muscle use noted.  ABDOMEN: Soft and non tender to palpation, no distention noted.   IV discontinued, cath removed intact

## 2022-03-28 NOTE — ED PROVIDER NOTES
Encounter Date: 3/28/2022       History     Chief Complaint   Patient presents with    Abdominal Pain     Under r rib      62-year-old female with past medical history of hyperlipidemia who presents to the emergency department with chief complaint of pain beneath the right lower rib that began 3 days ago. Reports pain has been relatively constant since onset. Pain is worse with breathing and positional changes.  Denies anterior chest pain, shortness of breath, abdominal pain, nausea, vomiting, diarrhea, dysuria.  She denies symptoms of this nature in the past.  She denies any other worsening or alleviating factors.        Review of patient's allergies indicates:  No Known Allergies  Past Medical History:   Diagnosis Date    Hyperlipidemia 3/26/2013     Past Surgical History:   Procedure Laterality Date    ADENOIDECTOMY      LASIK      TONSILLECTOMY       Family History   Problem Relation Age of Onset    Heart disease Mother     Breast cancer Mother     Cancer Father         thyroid    Thyroid disease Sister     Heart disease Maternal Grandmother     Heart disease Other     Cancer Cousin         leukemia    Diabetes Neg Hx     Hypertension Neg Hx      Social History     Tobacco Use    Smoking status: Former Smoker    Smokeless tobacco: Never Used   Substance Use Topics    Alcohol use: No    Drug use: No     Review of Systems   Constitutional: Negative for fever.   HENT: Negative for sore throat.    Respiratory: Negative for shortness of breath.    Cardiovascular: Positive for chest pain (r rib pain ).   Gastrointestinal: Negative for nausea.   Genitourinary: Negative for dysuria.   Musculoskeletal: Negative for back pain.   Skin: Negative for rash.   Neurological: Negative for weakness.   Hematological: Does not bruise/bleed easily.       Physical Exam     Initial Vitals [03/28/22 1033]   BP Pulse Resp Temp SpO2   (!) 187/77 82 18 98.8 °F (37.1 °C) 97 %      MAP       --         Physical  Exam    Nursing note and vitals reviewed.  Constitutional: She appears well-developed and well-nourished. She is not diaphoretic. No distress.   HENT:       Head: Normocephalic and atraumatic.   Mouth/Throat: Oropharynx is clear and moist.   Eyes: Conjunctivae and EOM are normal. Pupils are equal, round, and reactive to light.   Neck: Neck supple.   Normal range of motion.  Cardiovascular: Normal rate, regular rhythm, normal heart sounds and intact distal pulses. Exam reveals no gallop and no friction rub.    No murmur heard.  Pulmonary/Chest: Breath sounds normal. She has no wheezes. She has no rhonchi. She has no rales. She exhibits tenderness (right lower rib ).   Abdominal: Abdomen is soft. Bowel sounds are normal. There is no abdominal tenderness. There is no rebound and no guarding.   Musculoskeletal:         General: Normal range of motion.      Cervical back: Normal range of motion and neck supple.     Neurological: She is alert and oriented to person, place, and time. She has normal strength. No cranial nerve deficit or sensory deficit. GCS score is 15. GCS eye subscore is 4. GCS verbal subscore is 5. GCS motor subscore is 6.   Skin: Skin is warm and dry. Capillary refill takes less than 2 seconds. Rash noted.   Scattered papular rash noted to right upper quadrant. Does not quite appear vesicular in nature. See image below.    Psychiatric: She has a normal mood and affect. Her behavior is normal. Judgment and thought content normal.         ED Course   Procedures  Labs Reviewed   CBC W/ AUTO DIFFERENTIAL - Abnormal; Notable for the following components:       Result Value    Hematocrit 36.4 (*)     All other components within normal limits   COMPREHENSIVE METABOLIC PANEL - Abnormal; Notable for the following components:    Potassium 3.2 (*)     Calcium 10.9 (*)     Albumin 2.9 (*)     Alkaline Phosphatase 192 (*)     AST 77 (*)      (*)     All other components within normal limits   D DIMER,  QUANTITATIVE - Abnormal; Notable for the following components:    D-Dimer 2.90 (*)     All other components within normal limits   HEPATITIS C ANTIBODY    Narrative:     Release to patient->Immediate   TROPONIN I   B-TYPE NATRIURETIC PEPTIDE   HIV 1 / 2 ANTIBODY     EKG Readings: (Independently Interpreted)   Initial Reading: No STEMI. Rhythm: Normal Sinus Rhythm. Heart Rate: 70.     ECG Results          EKG 12-lead (Edited Result - FINAL)  Result time 03/28/22 14:06:05    Edited Result - FINAL by Interface, Lab In [facility] (03/28/22 14:06:05)                 Narrative:    Test Reason : R07.9,    Vent. Rate : 070 BPM     Atrial Rate : 070 BPM     P-R Int : 132 ms          QRS Dur : 100 ms      QT Int : 368 ms       P-R-T Axes : 073 073 039 degrees     QTc Int : 397 ms    Normal sinus rhythm  Incomplete right bundle branch block  Borderline Abnormal ECG  When compared with ECG of 02-MAR-2022 09:57,  No significant change was found  Reconfirmed by LUTHER FERGUSON MD ([id number]) on 3/28/2022 2:05:54 PM    Referred By: [email or web address]   SELF           Confirmed By:LUTHER FERGUSON MD                            Imaging Results          US Abdomen Limited (Final result)  Result time 03/28/22 15:38:48    Final result by Dav Gaytan MD (03/28/22 15:38:48)                 Impression:      Cholelithiasis and biliary sludge with no evidence of acute cholecystitis.      Electronically signed by: Dav Gaytan MD  Date:    03/28/2022  Time:    15:38             Narrative:    EXAMINATION:  US ABDOMEN LIMITED    CLINICAL HISTORY:  elevated LFTs;.    TECHNIQUE:  Limited ultrasound of the right upper quadrant of the abdomen including pancreas, liver, gallbladder, common bile duct was performed.    COMPARISON:  CTA chest from 03/28/2022    FINDINGS:  Liver: Normal in size, measuring 15.6 cm. Homogeneous echotexture. No focal hepatic lesions.    Gallbladder: A few gallstones and layering biliary sludge are present.  There is no  gallbladder wall thickening or pericholecystic fluid.  No sonographic Lopes's sign.    Biliary system: The common duct is not dilated, measuring 3 mm.  No intrahepatic ductal dilatation.    Spleen: Normal in size with a homogeneous echotexture, measuring 6.1 x 3.8 cm.    Pancreas: The visualized portions of pancreas appear normal.    Miscellaneous: No ascites.                               CTA Chest Non-Coronary (PE Study) (Final result)  Result time 03/28/22 13:01:55    Final result by Danielle Landon MD (03/28/22 13:01:55)                 Impression:      1.  Examination limited by respiratory motion artifact.  However, no pulmonary embolus is seen to the segmental branches, including within the right middle lobe where there is near total lobar consolidation.  No evidence of right middle lobe bronchus obstruction.      Electronically signed by: Danielle Landon  Date:    03/28/2022  Time:    13:01             Narrative:    EXAMINATION:  CTA CHEST NON CORONARY    CLINICAL HISTORY:  Pulmonary embolism (PE) suspected, positive D-dimer;    TECHNIQUE:  Low dose axial images, sagittal and coronal reformations were obtained from the thoracic inlet to the lung bases following the IV administration of 75 mL of Omnipaque 350.  Contrast timing was optimized to evaluate the pulmonary arteries.  MIP images were performed.    COMPARISON:  Chest radiograph 03/28/2022    FINDINGS:  No pulmonary embolus is seen to the segmental branches.  If there are more distal emboli present, they may not be observable due to respiratory motion artifact.    The heart size is normal and there is no pericardial effusion.  The aorta and pulmonary artery are normal in diameter.    There is near complete consolidation of the right middle lobe with air bronchograms.  The right middle lobe bronchus is patent.  The branches of the pulmonary arteries to the right middle lobe are noted to opacify to the segmental branches.    There is mild biapical pleural  and parenchymal scarring.    Band-like opacity in the right upper lobe noted, likely subsegmental atelectasis.    The thyroid gland, trachea, and esophagus appear unremarkable.  No abnormally enlarged lymph nodes.    There is partial imaging of the upper abdomen, and no additional significant abnormalities are seen.    No suspicious bone lesions.                               X-Ray Chest PA And Lateral (Edited Result - FINAL)  Result time 03/28/22 12:58:01    Addendum 1 of 1 by Danielle Landon MD (03/28/22 12:58:01)      The following correction is made.  As the consolidative opacity in the right mid to lower lung zone is silhouetting the right heart border, it is likely in the right middle lobe.      Electronically signed by: Danielle Landon  Date:    03/28/2022  Time:    12:58                 Final result by Danielle Landon MD (03/28/22 12:17:12)                 Impression:      CT new right lower lobe consolidation.  Concerning for pneumonia.      Electronically signed by: Danielle Landon  Date:    03/28/2022  Time:    12:17             Narrative:    EXAMINATION:  XR CHEST PA AND LATERAL    CLINICAL HISTORY:  Chest Pain;    TECHNIQUE:  PA and lateral views of the chest were performed.    COMPARISON:  12/23/2019    FINDINGS:  There is a new consolidative opacity of the right lower lobe.  There may be a milder patchy airspace opacity in the right apex.  The heart size appears normal.  Insert bone                              X-Rays:   Independently Interpreted Readings:   Chest X-Ray: Normal heart size. Right mid/lower lobe opacity. No pneumothorax.      Medications   ketorolac injection 9.999 mg (9.999 mg Intravenous Given 3/28/22 1118)   acetaminophen tablet 650 mg (650 mg Oral Given 3/28/22 1115)   iohexoL (OMNIPAQUE 350) injection 75 mL (75 mLs Intravenous Given 3/28/22 1231)     Medical Decision Making:   History:   Old Medical Records: I decided to obtain old medical records.  Initial Assessment:   Emergent  evaluation of a 62-year-old female who presents to the emergency department with chief complaint of pain below her right rib that began three days ago. Relatively constant since onset. Patient is afebrile, hemodynamically stable, and non toxic appearing. Will order labs, imaging, ekg, and continue to monitor.   Differential Diagnosis:   Differential diagnosis includes but is not limited to pulmonary embolism, ACS, pneumonia, cholecystitis, hepatitis, gastritis.  Independently Interpreted Test(s):   I have ordered and independently interpreted X-rays - see prior notes.  I have ordered and independently interpreted EKG Reading(s) - see prior notes  Clinical Tests:   Lab Tests: Ordered and Reviewed  Radiological Study: Ordered and Reviewed  Medical Tests: Ordered and Reviewed  ED Management:  No severe hematologic derangements.  Elevated LFTs compared to prior.  Total bilirubin normal. Patient has no TTP in the right upper quadrant, however will proceed with RUQ US to rule out emergent etiology of transaminitis.   Troponin 0.006.  BNP negative.  D-dimer 2.90.  Will proceed with CTA PE study.  CXR concerning for right mid/lower lobe pneumonia.   US reveals cholelithiasis and biliary sludge with no evidence of acute cholecystitis.   CTA PE study: Examination limited by respiratory motion artifact.  However, no pulmonary embolus is seen to the segmental branches, including within the right middle lobe where there is near total lobar consolidation.  No evidence of right middle lobe bronchus obstruction.     Patient reports improvement of symptoms with ED management.   She does have elevated LFTs, but was also recently started on a new statin in January. No evidence of cholecystitis on imaging today. She has no ttp in the right upper quadrant on US. Will refer to gen surg for outpatient workup.   Will treat with azithromycin and amoxicillin for pneumonia.   Recommend close outpatient follow up. All questions answered.   The  patient was instructed to follow up with a primary care provider and general surgery or to return to the emergency department for worsening symptoms. The treatment plan was discussed with the patient who demonstrated understanding and comfort with plan.              ED Course as of 03/28/22 1629   Mon Mar 28, 2022   1259 WBC: 5.57 [JM]   1259 Hemoglobin: 12.1 [JM]   1300 Hematocrit(!): 36.4 [JM]   1300 Platelets: 341 [JM]   1300 Potassium(!): 3.2 [JM]   1300 BUN: 11 [JM]   1300 Creatinine: 0.7 [JM]   1300 Alkaline Phosphatase(!): 192 [JM]   1300 AST(!): 77 [JM]   1300 ALT(!): 121 [JM]   1300 D-Dimer(!): 2.90 [JM]   1300 Troponin I: 0.006 [JM]   1300 BNP: <10 [JM]      ED Course User Index  [JM] Ria Padron PA-C             Clinical Impression:   Final diagnoses:  [R07.9] Chest pain  [J18.9] Pneumonia of right middle lobe due to infectious organism (Primary)  [R74.01] Transaminitis  [K80.20] Calculus of gallbladder without cholecystitis without obstruction          ED Disposition Condition    Discharge Stable        ED Prescriptions     Medication Sig Dispense Start Date End Date Auth. Provider    azithromycin (Z-JORDI) 250 MG tablet Take 1 tablet (250 mg total) by mouth once daily. Take first 2 tablets together, then 1 every day until finished. 6 tablet 3/28/2022  Ria Padron PA-C    amoxicillin (AMOXIL) 500 MG capsule Take 2 capsules (1,000 mg total) by mouth 3 (three) times daily. for 5 days 30 capsule 3/28/2022 4/2/2022 Ria Padron PA-C        Follow-up Information     Follow up With Specialties Details Why Contact Info    [facility] - Emergency Dept Emergency Medicine Go to  If symptoms worsen [id number]  [facility] [address] [phone]    Crystal Matta MD Internal Medicine Schedule an appointment as soon as possible for a visit in 1 week  [address] [facility] [address]  [phone]             Ria Padron PA-C  03/28/22 9414

## 2022-03-29 ENCOUNTER — TELEPHONE (OUTPATIENT)
Dept: INTERNAL MEDICINE | Facility: CLINIC | Age: 62
End: 2022-03-29

## 2022-03-29 DIAGNOSIS — R74.8 ELEVATED LIVER ENZYMES: Primary | ICD-10-CM

## 2022-03-29 NOTE — TELEPHONE ENCOUNTER
Pt was seen in the ED for pneumonia and elevated liver enzymes. Pt is wanting to change med rosuvastatin (CRESTOR) 10 MG. Please advise.  LOV:01/28/2022  RTC:05/02/2022

## 2022-03-29 NOTE — TELEPHONE ENCOUNTER
Please inform patient that I recommend she not take any cholesterol medication for the time being and till her liver enzymes improve.  Advised patient that we will repeat her labs in 1 month as scheduled, and discuss alternative therapy at that time.  Meanwhile, recommend repeat liver enzymes in 2 weeks as well.  Please schedule.

## 2022-03-29 NOTE — TELEPHONE ENCOUNTER
----- Message from Ellie Talbot sent at 3/29/2022  8:25 AM CDT -----  Contact: [phone] @ Patient  Good Morning,  Patient called and would like a call from office. Patient was in Hospital ER. Patient has pneumonia and liver function elevated. Patient went to [facility]. Patient was given a antibiotics. Patient state that rosuvastatin (CRESTOR) 10 MG tablet has liver elevated and she would like to change Rx. Patient will not take any longer.      Please call and advise

## 2022-04-05 ENCOUNTER — TELEPHONE (OUTPATIENT)
Dept: INTERNAL MEDICINE | Facility: CLINIC | Age: 62
End: 2022-04-05
Payer: MEDICAID

## 2022-04-05 DIAGNOSIS — R74.8 ELEVATED LIVER ENZYMES: Primary | ICD-10-CM

## 2022-04-05 NOTE — TELEPHONE ENCOUNTER
Spoke to pt and she states she needs to have her liver enzymes checked before her next appointment. Please order, pt also had labs ordered on 01/28/2022 please advise if you would like these done too.    Scheduled pt for a hospital f/u on 04/25/2022 at 10:30 am. She would like to cancel the 05/02/2022 appt, appt canceled.

## 2022-04-05 NOTE — TELEPHONE ENCOUNTER
----- Message from Kady Gonzalez sent at 4/5/2022 12:45 PM CDT -----  Contact: Pt [phone]  REQUESTING A SOONER APPOINTMENT.    No blue slot available to schedule an appointment for the patient.  Patient is established with which PCP: Dr Matta  Reason for the visit: ER Follow up (3/28/22)  Would the patient like a call back, or a response through their [facility] portal?: Call back    Patient states she will need NEW BLOOD WORK before the appt.     Please call and advise.    Thank You

## 2022-04-05 NOTE — TELEPHONE ENCOUNTER
Please schedule hepatic function panel.  Also, I recommend we defer the CMP and lipid panel that were ordered on January 28th until her liver enzymes normalize.

## 2022-04-19 LAB
ALBUMIN: 4.4 G/DL (ref 3.5–5)
ALP SERPL-CCNC: 74 U/L (ref 38–126)
ALT SERPL W P-5'-P-CCNC: 16 U/L (ref 7–56)
AST SERPL-CCNC: 16 U/L (ref 7–40)
BILIRUB SERPL-MCNC: 0.3 MG/DL (ref 0–1.2)
BILIRUBIN DIRECT+TOT PNL SERPL-MCNC: <0.1 MG/DL (ref 0–0.3)
TOTAL PROTEIN: 7.5 G/DL (ref 6.3–8.2)

## 2022-04-25 ENCOUNTER — HOSPITAL ENCOUNTER (OUTPATIENT)
Dept: RADIOLOGY | Facility: HOSPITAL | Age: 62
Discharge: HOME | End: 2022-04-25
Attending: INTERNAL MEDICINE
Payer: MEDICAID

## 2022-04-25 ENCOUNTER — OFFICE VISIT (OUTPATIENT)
Dept: INTERNAL MEDICINE | Facility: CLINIC | Age: 62
End: 2022-04-25
Payer: MEDICAID

## 2022-04-25 VITALS
BODY MASS INDEX: 21.91 KG/M2 | WEIGHT: 108.69 LBS | OXYGEN SATURATION: 94 % | TEMPERATURE: 97 F | HEART RATE: 81 BPM | DIASTOLIC BLOOD PRESSURE: 76 MMHG | SYSTOLIC BLOOD PRESSURE: 122 MMHG | HEIGHT: 59 IN

## 2022-04-25 DIAGNOSIS — K80.80 BILIARY CALCULUS OF OTHER SITE WITHOUT OBSTRUCTION: ICD-10-CM

## 2022-04-25 DIAGNOSIS — J18.9 PNEUMONIA OF RIGHT MIDDLE LOBE DUE TO INFECTIOUS ORGANISM: ICD-10-CM

## 2022-04-25 DIAGNOSIS — E78.5 HYPERLIPIDEMIA, UNSPECIFIED HYPERLIPIDEMIA TYPE: Primary | ICD-10-CM

## 2022-04-25 PROCEDURE — 99999 PR PBB SHADOW E&M-EST. PATIENT-LVL V: ICD-10-PCS | Mod: PBBFAC,,, | Performed by: INTERNAL MEDICINE

## 2022-04-25 PROCEDURE — 71046 X-RAY EXAM CHEST 2 VIEWS: CPT | Mod: 26,,, | Performed by: RADIOLOGY

## 2022-04-25 PROCEDURE — 3078F DIAST BP <80 MM HG: CPT | Mod: CPTII,,, | Performed by: INTERNAL MEDICINE

## 2022-04-25 PROCEDURE — 3074F PR MOST RECENT SYSTOLIC BLOOD PRESSURE < 130 MM HG: ICD-10-PCS | Mod: CPTII,,, | Performed by: INTERNAL MEDICINE

## 2022-04-25 PROCEDURE — 1159F PR MEDICATION LIST DOCUMENTED IN MEDICAL RECORD: ICD-10-PCS | Mod: CPTII,,, | Performed by: INTERNAL MEDICINE

## 2022-04-25 PROCEDURE — 1159F MED LIST DOCD IN RCRD: CPT | Mod: CPTII,,, | Performed by: INTERNAL MEDICINE

## 2022-04-25 PROCEDURE — 1160F PR REVIEW ALL MEDS BY PRESCRIBER/CLIN PHARMACIST DOCUMENTED: ICD-10-PCS | Mod: CPTII,,, | Performed by: INTERNAL MEDICINE

## 2022-04-25 PROCEDURE — 71046 XR CHEST PA AND LATERAL: ICD-10-PCS | Mod: 26,,, | Performed by: RADIOLOGY

## 2022-04-25 PROCEDURE — 1160F RVW MEDS BY RX/DR IN RCRD: CPT | Mod: CPTII,,, | Performed by: INTERNAL MEDICINE

## 2022-04-25 PROCEDURE — 99213 OFFICE O/P EST LOW 20 MIN: CPT | Mod: S$PBB,,, | Performed by: INTERNAL MEDICINE

## 2022-04-25 PROCEDURE — 71046 X-RAY EXAM CHEST 2 VIEWS: CPT | Mod: TC,PO

## 2022-04-25 PROCEDURE — 3044F HG A1C LEVEL LT 7.0%: CPT | Mod: CPTII,,, | Performed by: INTERNAL MEDICINE

## 2022-04-25 PROCEDURE — 3078F PR MOST RECENT DIASTOLIC BLOOD PRESSURE < 80 MM HG: ICD-10-PCS | Mod: CPTII,,, | Performed by: INTERNAL MEDICINE

## 2022-04-25 PROCEDURE — 3008F PR BODY MASS INDEX (BMI) DOCUMENTED: ICD-10-PCS | Mod: CPTII,,, | Performed by: INTERNAL MEDICINE

## 2022-04-25 PROCEDURE — 3074F SYST BP LT 130 MM HG: CPT | Mod: CPTII,,, | Performed by: INTERNAL MEDICINE

## 2022-04-25 PROCEDURE — 99215 OFFICE O/P EST HI 40 MIN: CPT | Mod: PBBFAC,25,PO | Performed by: INTERNAL MEDICINE

## 2022-04-25 PROCEDURE — 99999 PR PBB SHADOW E&M-EST. PATIENT-LVL V: CPT | Mod: PBBFAC,,, | Performed by: INTERNAL MEDICINE

## 2022-04-25 PROCEDURE — 3044F PR MOST RECENT HEMOGLOBIN A1C LEVEL <7.0%: ICD-10-PCS | Mod: CPTII,,, | Performed by: INTERNAL MEDICINE

## 2022-04-25 PROCEDURE — 99213 PR OFFICE/OUTPT VISIT, EST, LEVL III, 20-29 MIN: ICD-10-PCS | Mod: S$PBB,,, | Performed by: INTERNAL MEDICINE

## 2022-04-25 PROCEDURE — 3008F BODY MASS INDEX DOCD: CPT | Mod: CPTII,,, | Performed by: INTERNAL MEDICINE

## 2022-04-25 RX ORDER — NYSTATIN 100000 [USP'U]/G
POWDER TOPICAL 2 TIMES DAILY
Qty: 30 G | Refills: 0 | Status: SHIPPED | OUTPATIENT
Start: 2022-04-25

## 2022-04-25 NOTE — PROGRESS NOTES
CC: followup of ED visit for pneumonia  HPI:  The patient is a 62 y.o. year old female who presents to the office for followup of ED visit for pneuimonia.  She reports she was able to tolerate cholesterol medication for two months.  She then developed low grade temperature, poor appetite and fatigue.  Her symptoms have improved.  Her liver enzymes have improved.  Ultrasound showed gallstones and biliary sludge.  The patient denies any chest pain, shortness of breath, headache, blurred vision, excessive fatigue, nausea or vomiting.      PAST MEDICAL HISTORY:  Past Medical History:   Diagnosis Date    Hyperlipidemia 3/26/2013       SURGICAL HISTORY:  Past Surgical History:   Procedure Laterality Date    ADENOIDECTOMY      LASIK      TONSILLECTOMY         MEDS:  Medcard reviewed and updated    ALLERGIES: Allergy Card reviewed and updated    SOCIAL HISTORY:   The patient is a nonsmoker.    PE:   APPEARANCE: Well nourished, well developed, in no acute distress.    CHEST: Lungs clear to auscultation with unlabored respirations.  CARDIOVASCULAR: Normal S1, S2. No murmurs. No carotid bruits. No pedal edema.  ABDOMEN: Bowel sounds normal. Not distended. Soft. No tenderness or masses.   PSYCHIATRIC: The patient is oriented to person, place, and time and has a pleasant affect.        ASSESSMENT/PLAN:  Laura was seen today for hospital follow up.    Diagnoses and all orders for this visit:    Hyperlipidemia, unspecified hyperlipidemia type  -     Comprehensive Metabolic Panel; Future  -     Lipid Panel; Future  -     Ambulatory referral/consult to Cardiology; Future    Biliary calculus of other site without obstruction  -     Ambulatory referral/consult to General Surgery; Future    Pneumonia of right middle lobe due to infectious organism  -     X-Ray Chest PA And Lateral; Future    Other orders  -     nystatin (MYCOSTATIN) powder; Apply topically 2 (two) times daily.

## 2022-05-02 ENCOUNTER — TELEPHONE (OUTPATIENT)
Dept: SURGERY | Facility: CLINIC | Age: 62
End: 2022-05-02
Payer: MEDICAID

## 2022-05-02 NOTE — TELEPHONE ENCOUNTER
Left message for pt to call back to schedule or schedule through My Chart.  ----- Message from Antonio Olivre sent at 5/2/2022 10:05 AM CDT -----  Regarding: appt  Contact: [phone]  Pt is calling to schedule appt from referral. Please contact pt

## 2022-05-04 ENCOUNTER — OFFICE VISIT (OUTPATIENT)
Dept: SURGERY | Facility: CLINIC | Age: 62
End: 2022-05-04
Payer: MEDICAID

## 2022-05-04 DIAGNOSIS — E78.5 HYPERLIPIDEMIA, UNSPECIFIED HYPERLIPIDEMIA TYPE: ICD-10-CM

## 2022-05-04 DIAGNOSIS — K80.21 CALCULUS OF GALLBLADDER WITH BILIARY OBSTRUCTION BUT WITHOUT CHOLECYSTITIS: Primary | ICD-10-CM

## 2022-05-04 PROCEDURE — 1160F PR REVIEW ALL MEDS BY PRESCRIBER/CLIN PHARMACIST DOCUMENTED: ICD-10-PCS | Mod: CPTII,95,, | Performed by: SURGERY

## 2022-05-04 PROCEDURE — 1159F PR MEDICATION LIST DOCUMENTED IN MEDICAL RECORD: ICD-10-PCS | Mod: CPTII,95,, | Performed by: SURGERY

## 2022-05-04 PROCEDURE — 3044F HG A1C LEVEL LT 7.0%: CPT | Mod: CPTII,95,, | Performed by: SURGERY

## 2022-05-04 PROCEDURE — 99203 OFFICE O/P NEW LOW 30 MIN: CPT | Mod: 95,,, | Performed by: SURGERY

## 2022-05-04 PROCEDURE — 1159F MED LIST DOCD IN RCRD: CPT | Mod: CPTII,95,, | Performed by: SURGERY

## 2022-05-04 PROCEDURE — 99203 PR OFFICE/OUTPT VISIT, NEW, LEVL III, 30-44 MIN: ICD-10-PCS | Mod: 95,,, | Performed by: SURGERY

## 2022-05-04 PROCEDURE — 3044F PR MOST RECENT HEMOGLOBIN A1C LEVEL <7.0%: ICD-10-PCS | Mod: CPTII,95,, | Performed by: SURGERY

## 2022-05-04 PROCEDURE — 1160F RVW MEDS BY RX/DR IN RCRD: CPT | Mod: CPTII,95,, | Performed by: SURGERY

## 2022-05-04 NOTE — PROGRESS NOTES
History & Physical    SUBJECTIVE:     History of Present Illness:  Ms. Way is a 62 year-old woman with HLD who presents for discussion of cholelithiasis. She states she started rosuvastatin for high cholesterol at the end of January and 2 months later presented to the ED on 3/28/22 with 3-days of right-sided rib pain which she though was 2/2 singled and was diagnosed with RLL PNA at the time. Labs also revealed elevated LFTs including , AST 77, and Alk Phos 192. US was obtained which was personally reviewed noting cholelithiasis without e/o cholecystitis. She endorses associated anorexia at the time that persisted for 2-3 weeks, during which time she really only tolerated water and Gatorade. She took a home Covid test which was negative. She denies fevers, chills, nausea, vomiting or diarrhea. She stopped taking the medication and on f/u labs her LFTs 4/19/22 had returned to normal. She subjectively has just started to feel normal again as well. She denies any prior history of postprandial RUQ abd pain, jaundice, or other abdominal complaints.    Chief Complaint   Patient presents with    Cholelithiasis     Review of patient's allergies indicates:  No Known Allergies    Current Outpatient Medications   Medication Sig Dispense Refill    aspirin 81 MG Chew Take 81 mg by mouth once daily.      azithromycin (Z-JORDI) 250 MG tablet Take 1 tablet (250 mg total) by mouth once daily. Take first 2 tablets together, then 1 every day until finished. 6 tablet 0    B1/B2/niacin/B12/protease (B-COMPLEX WITH B-12 ORAL) Take by mouth.      ergocalciferol (ERGOCALCIFEROL) 50,000 unit Cap Take 1 capsule (50,000 Units total) by mouth every 7 days. (Patient not taking: Reported on 4/25/2022) 4 capsule 3    fish oil-omega-3 fatty acids 300-1,000 mg capsule Take 2 g by mouth once daily.      Lacto.acidophilus-Bif.animalis (PROBIOTIC) 5 billion cell CpSP       multivitamin capsule Take 1 capsule by mouth once daily.       nystatin (MYCOSTATIN) powder Apply topically 2 (two) times daily. 30 g 0    vit A/vit C/biotin/zinc/copper (HAIR-SKIN-NAIL,VIT A,C-BIOTIN, ORAL) Take by mouth.       No current facility-administered medications for this visit.     Past Medical History:   Diagnosis Date    Hyperlipidemia 3/26/2013     Past Surgical History:   Procedure Laterality Date    ADENOIDECTOMY      LASIK      TONSILLECTOMY       Family History   Problem Relation Age of Onset    Heart disease Mother     Breast cancer Mother     Cancer Father         thyroid    Thyroid disease Sister     Heart disease Maternal Grandmother     Heart disease Other     Cancer Cousin         leukemia    Diabetes Neg Hx     Hypertension Neg Hx      Social History     Tobacco Use    Smoking status: Former Smoker    Smokeless tobacco: Never Used   Substance Use Topics    Alcohol use: No    Drug use: No      Review of Systems:  Review of Systems   Constitutional: Negative for chills and fever.   HENT: Negative.    Eyes: Negative.    Respiratory: Negative.    Cardiovascular: Negative.    Gastrointestinal: Negative.    Endocrine: Negative.    Genitourinary: Negative.    Musculoskeletal: Negative.    Skin: Negative.    Allergic/Immunologic: Negative.    Neurological: Negative.    Hematological: Negative.    Psychiatric/Behavioral: Negative.      OBJECTIVE:     Physical Exam:  Physical Exam  Constitutional:       General: She is not in acute distress.     Appearance: She is not toxic-appearing.   Pulmonary:      Effort: Pulmonary effort is normal. No respiratory distress.   Neurological:      Mental Status: She is alert and oriented to person, place, and time.   Psychiatric:         Mood and Affect: Mood normal.         Behavior: Behavior normal.       Laboratory  CBC: Reviewed  CMP: Reviewed  LFTs: Reviewed    Diagnostic Results:  Labs: Reviewed  US: Reviewed  CT: Reviewed    ASSESSMENT/PLAN:   Patient is a 62 year-old woman with HLD who was noted to  have transiently elevated LFTs and cholelithiasis without cholecystitis 2.5 months ago at which time she had started a new statin and was diagnosed with possible RLL PNA. We discussed that her symptoms and lab abnormalities may very well have been due to her gallstones, however given that it was a single occurrence and with other associated medication changes and imaging findings, her gallstones may also be incidental in nature. We discussed the options of elective cholecystectomy vs watchful waiting, and the typical signs and symptoms of biliary colic or symptomatic cholelithiasis. We discussed the technical and convalescent aspects of cholecystectomy and the risks, benefits and alternatives. All questions were answered to her satisfaction and she would like to hold off on pursuing cholecystectomy at this time. She will reach out if she develops any concerning symptoms or would like to readdress elective surgery. It was a pleasure meeting MsJaspreet Seamus and thank you for this consultation.    Gladis Cordero  5/4/22

## 2022-05-16 ENCOUNTER — TELEPHONE (OUTPATIENT)
Dept: INTERNAL MEDICINE | Facility: CLINIC | Age: 62
End: 2022-05-16
Payer: MEDICAID

## 2022-05-16 DIAGNOSIS — E78.5 HYPERLIPIDEMIA, UNSPECIFIED HYPERLIPIDEMIA TYPE: Primary | ICD-10-CM

## 2022-05-16 NOTE — TELEPHONE ENCOUNTER
----- Message from Margaret Fay sent at 5/16/2022  3:51 PM CDT -----  Contact: pt [phone]  Calling to check on labs for [facility].    Please call and advise.    Thank You

## 2022-05-16 NOTE — TELEPHONE ENCOUNTER
----- Message from Elena Ramos sent at 5/16/2022  9:59 AM CDT -----  Contact: [phone]  Pt need lab orders sent to [facility] ! Please f/u

## 2022-05-16 NOTE — TELEPHONE ENCOUNTER
----- Message from Elena Ramos sent at 5/16/2022  9:59 AM CDT -----  Contact: [phone]  Pt need lab orders sent to [facility] ! Please f/u

## 2022-05-18 LAB
ALBUMIN: 4.4 G/DL (ref 3.5–5)
ALP SERPL-CCNC: 55 U/L (ref 38–126)
ALT SERPL W P-5'-P-CCNC: 13 U/L (ref 7–56)
ANION GAP SERPL CALC-SCNC: 15.2 MMOL/L (ref 9–18)
AST SERPL-CCNC: 15 U/L (ref 7–40)
BILIRUB SERPL-MCNC: 0.4 MG/DL (ref 0–1.2)
BUN BLD-MCNC: 12 MG/DL (ref 7–21)
BUN/CREAT SERPL: 24 (ref 6–22)
CALC OSMOLALITY: 280 MOSM/KG (ref 275–295)
CALCIUM SERPL-MCNC: 10 MG/DL (ref 8.5–10.3)
CHLORIDE SERPL-SCNC: 103 MMOL/L (ref 98–107)
CHOL/HDLC RATIO: 5.87
CHOLEST SERPL-MSCNC: 317 MG/DL (ref 100–200)
CO2 SERPL-SCNC: 27 MMOL/L (ref 21–31)
CREAT SERPL-MCNC: 0.5 MG/DL (ref 0.5–1)
EGFR: 120 ML/MIN
GFR: 104 ML/MIN
GLUCOSE SERPL-MCNC: 87 MG/DL (ref 70–100)
HDLC SERPL-MCNC: 54 MG/DL (ref 40–75)
LDLC SERPL CALC-MCNC: 238 MG/DL (ref 0–125)
POTASSIUM SERPL-SCNC: 4.2 MMOL/L (ref 3.5–5)
SODIUM BLD-SCNC: 141 MMOL/L (ref 135–145)
TOTAL PROTEIN: 7.1 G/DL (ref 6.3–8.2)
TRIGL SERPL-MCNC: 158 MG/DL (ref 30–150)

## 2022-05-19 ENCOUNTER — TELEPHONE (OUTPATIENT)
Dept: INTERNAL MEDICINE | Facility: CLINIC | Age: 62
End: 2022-05-19
Payer: MEDICAID

## 2022-05-19 ENCOUNTER — PATIENT MESSAGE (OUTPATIENT)
Dept: INTERNAL MEDICINE | Facility: CLINIC | Age: 62
End: 2022-05-19
Payer: MEDICAID

## 2022-05-19 NOTE — TELEPHONE ENCOUNTER
----- Message from Fannie Junior sent at 5/19/2022 12:16 PM CDT -----  Contact: pt [phone]  2TESTRESULTS    Type: Test Results    What test was performed? labs    Who ordered the test? Sigrid    When and where were the test performed? 5/18/2022, [facility]    Would you like response via Mychart: phone    Comments:

## 2022-05-19 NOTE — TELEPHONE ENCOUNTER
Please inform patient that labs are significant for rising cholesterol.  Cholesterol has increased from 261-317.  Triglycerides remain stable at 158.  They were 151 previously.  LDL has increased from 183 to 238.  It is unclear why cholesterol is rising.  The calculated 10 year risk for vascular disease is only mildly elevated at 5.1%.  Normal is less than 5%.  However, an LDL over 180 is concerning.  I recommend patient continue to eat a healthy diet and keep upcoming appointment with Cardiology.    The 10-year ASCVD risk score (Alvarobruce BOONE Jr., et al., 2013) is: 5.1%    Values used to calculate the score:      Age: 62 years      Sex: Female      Is Non- : No      Diabetic: No      Tobacco smoker: No      Systolic Blood Pressure: 122 mmHg      Is BP treated: No      HDL Cholesterol: 54 mg/dL      Total Cholesterol: 317 mg/dL

## 2022-05-20 ENCOUNTER — TELEPHONE (OUTPATIENT)
Dept: CARDIOLOGY | Facility: CLINIC | Age: 62
End: 2022-05-20
Payer: MEDICAID

## 2022-05-20 ENCOUNTER — PATIENT MESSAGE (OUTPATIENT)
Dept: INTERNAL MEDICINE | Facility: CLINIC | Age: 62
End: 2022-05-20
Payer: MEDICAID

## 2022-05-20 NOTE — TELEPHONE ENCOUNTER
----- Message from [facility] sent at 5/20/2022  1:15 PM CDT -----  Type:  Needs Medical Advice    Who Called: PT   Would the patient rather a call back or a response via [facility]? Callback   Best Call Back Number: [phone]  Additional Information: Pt requesting callback from office to discuss doctor specialties

## 2022-05-20 NOTE — TELEPHONE ENCOUNTER
Please inform patient that CoQ 10 does not lower cholesterol.  It is supposed to decrease the muscle toxicity of statins, but this has not been proven.  Red rice yeast has been shown to lower cholesterol because it behaves a lot like the statin drugs, but without regulation.  It is difficult to insure its purity or and effective dosage.

## 2022-05-20 NOTE — TELEPHONE ENCOUNTER
"Spoke to pt and she was freaking out about her results. She states she eats health and drinks water, and juice. She stays active. She is also having trouble finding a "Cholesterol Cardiologist" . Pt is asking for any suggestions to help get her cholesterol numbers down. She ask your advice on CoQ10 and red yeast rice. If so how much mg and how often.    Lab results also faxed to Dr.Wille Verdin at [phone]  "

## 2022-05-24 ENCOUNTER — OFFICE VISIT (OUTPATIENT)
Dept: CARDIOLOGY | Facility: CLINIC | Age: 62
End: 2022-05-24
Payer: MEDICAID

## 2022-05-24 VITALS
DIASTOLIC BLOOD PRESSURE: 70 MMHG | HEIGHT: 60 IN | OXYGEN SATURATION: 99 % | HEART RATE: 58 BPM | SYSTOLIC BLOOD PRESSURE: 122 MMHG | WEIGHT: 107.88 LBS | BODY MASS INDEX: 21.18 KG/M2

## 2022-05-24 DIAGNOSIS — E78.5 HYPERLIPIDEMIA, UNSPECIFIED HYPERLIPIDEMIA TYPE: ICD-10-CM

## 2022-05-24 DIAGNOSIS — E78.01 FAMILIAL HYPERCHOLESTEROLEMIA: Primary | ICD-10-CM

## 2022-05-24 PROCEDURE — 3008F PR BODY MASS INDEX (BMI) DOCUMENTED: ICD-10-PCS | Mod: CPTII,,, | Performed by: INTERNAL MEDICINE

## 2022-05-24 PROCEDURE — 3044F PR MOST RECENT HEMOGLOBIN A1C LEVEL <7.0%: ICD-10-PCS | Mod: CPTII,,, | Performed by: INTERNAL MEDICINE

## 2022-05-24 PROCEDURE — 1159F MED LIST DOCD IN RCRD: CPT | Mod: CPTII,,, | Performed by: INTERNAL MEDICINE

## 2022-05-24 PROCEDURE — 3078F DIAST BP <80 MM HG: CPT | Mod: CPTII,,, | Performed by: INTERNAL MEDICINE

## 2022-05-24 PROCEDURE — 1160F RVW MEDS BY RX/DR IN RCRD: CPT | Mod: CPTII,,, | Performed by: INTERNAL MEDICINE

## 2022-05-24 PROCEDURE — 99215 OFFICE O/P EST HI 40 MIN: CPT | Mod: PBBFAC,PN | Performed by: INTERNAL MEDICINE

## 2022-05-24 PROCEDURE — 3008F BODY MASS INDEX DOCD: CPT | Mod: CPTII,,, | Performed by: INTERNAL MEDICINE

## 2022-05-24 PROCEDURE — 3044F HG A1C LEVEL LT 7.0%: CPT | Mod: CPTII,,, | Performed by: INTERNAL MEDICINE

## 2022-05-24 PROCEDURE — 99203 OFFICE O/P NEW LOW 30 MIN: CPT | Mod: S$PBB,,, | Performed by: INTERNAL MEDICINE

## 2022-05-24 PROCEDURE — 3074F SYST BP LT 130 MM HG: CPT | Mod: CPTII,,, | Performed by: INTERNAL MEDICINE

## 2022-05-24 PROCEDURE — 1160F PR REVIEW ALL MEDS BY PRESCRIBER/CLIN PHARMACIST DOCUMENTED: ICD-10-PCS | Mod: CPTII,,, | Performed by: INTERNAL MEDICINE

## 2022-05-24 PROCEDURE — 99999 PR PBB SHADOW E&M-EST. PATIENT-LVL V: CPT | Mod: PBBFAC,,, | Performed by: INTERNAL MEDICINE

## 2022-05-24 PROCEDURE — 3078F PR MOST RECENT DIASTOLIC BLOOD PRESSURE < 80 MM HG: ICD-10-PCS | Mod: CPTII,,, | Performed by: INTERNAL MEDICINE

## 2022-05-24 PROCEDURE — 99203 PR OFFICE/OUTPT VISIT, NEW, LEVL III, 30-44 MIN: ICD-10-PCS | Mod: S$PBB,,, | Performed by: INTERNAL MEDICINE

## 2022-05-24 PROCEDURE — 3074F PR MOST RECENT SYSTOLIC BLOOD PRESSURE < 130 MM HG: ICD-10-PCS | Mod: CPTII,,, | Performed by: INTERNAL MEDICINE

## 2022-05-24 PROCEDURE — 1159F PR MEDICATION LIST DOCUMENTED IN MEDICAL RECORD: ICD-10-PCS | Mod: CPTII,,, | Performed by: INTERNAL MEDICINE

## 2022-05-24 PROCEDURE — 99999 PR PBB SHADOW E&M-EST. PATIENT-LVL V: ICD-10-PCS | Mod: PBBFAC,,, | Performed by: INTERNAL MEDICINE

## 2022-05-24 RX ORDER — ATORVASTATIN CALCIUM 40 MG/1
40 TABLET, FILM COATED ORAL DAILY
Qty: 90 TABLET | Refills: 3 | Status: SHIPPED | OUTPATIENT
Start: 2022-05-24 | End: 2022-08-15 | Stop reason: SDUPTHER

## 2022-05-24 RX ORDER — ZINC SULFATE 50(220)MG
220 CAPSULE ORAL DAILY
COMMUNITY
End: 2023-04-04

## 2022-05-24 NOTE — PROGRESS NOTES
Subjective:    Patient ID:  Laura Way is a 62 y.o. female who presents for evaluation of Hyperlipidemia      PCP/Referring: Crystal Matta MD     HPI  Pt is a 61 yo F w/ PMH of HLD who presents for evaluation of HLD.  She mentions that she was on pravastatin in the past however her LDL remained uncontrolled and she was changed to rosuvastatin however a few weeks later was seen in the ED w/ RUQ pain and was noted to have elevated LFTs and gallstones.  The rosuvastatin was held and she was noted to have worsening of her LDL and referred to cardiology.  She denies cp, sod, edema, orthopnea, PND, presyncope, LOC, palpitations, and claudication.  She was noted to have a LDL of 238, total chol 317, and trigs 158.  She is currently not on a statin.  She does not monitor her BP at home.  She does not exercise regularly however states she is active and denies limitations.      Past Medical History:   Diagnosis Date    Hyperlipidemia 3/26/2013     Past Surgical History:   Procedure Laterality Date    ADENOIDECTOMY      LASIK      TONSILLECTOMY       Social History     Socioeconomic History    Marital status:    Tobacco Use    Smoking status: Former Smoker    Smokeless tobacco: Never Used   Substance and Sexual Activity    Alcohol use: No    Drug use: No     Family History   Problem Relation Age of Onset    Heart disease Mother     Breast cancer Mother     Cancer Father         thyroid    Thyroid disease Sister     Heart disease Maternal Grandmother     Heart disease Other     Cancer Cousin         leukemia    Diabetes Neg Hx     Hypertension Neg Hx        Review of patient's allergies indicates:  No Known Allergies    Medication List with Changes/Refills   New Medications    ATORVASTATIN (LIPITOR) 40 MG TABLET    Take 1 tablet (40 mg total) by mouth once daily.   Current Medications    ASPIRIN 81 MG CHEW    Take 81 mg by mouth once daily.    B1/B2/NIACIN/B12/PROTEASE (B-COMPLEX WITH B-12 ORAL)     "Take by mouth.    ERGOCALCIFEROL (ERGOCALCIFEROL) 50,000 UNIT CAP    TAKE 1 CAPSULE BY MOUTH EVERY 7 DAYS    FISH OIL-OMEGA-3 FATTY ACIDS 300-1,000 MG CAPSULE    Take 2 g by mouth once daily.    LACTO.ACIDOPHILUS-BIF.ANIMALIS (PROBIOTIC) 5 BILLION CELL CPSP        MULTIVITAMIN CAPSULE    Take 1 capsule by mouth once daily.    NYSTATIN (MYCOSTATIN) POWDER    Apply topically 2 (two) times daily.    VIT A/VIT C/BIOTIN/ZINC/COPPER (HAIR-SKIN-NAIL,VIT A,C-BIOTIN, ORAL)    Take by mouth.    ZINC SULFATE (ZINCATE) 50 MG ZINC (220 MG) CAPSULE    Take 220 mg by mouth once daily.       Review of Systems   Constitutional: Negative for diaphoresis and fever.   HENT: Negative for congestion and hearing loss.    Eyes: Negative for blurred vision and pain.   Cardiovascular: Negative for chest pain, claudication, dyspnea on exertion, leg swelling, near-syncope, palpitations and syncope.   Respiratory: Negative for shortness of breath and sleep disturbances due to breathing.    Hematologic/Lymphatic: Negative for bleeding problem. Does not bruise/bleed easily.   Skin: Negative for color change and poor wound healing.   Gastrointestinal: Negative for abdominal pain and nausea.   Genitourinary: Negative for bladder incontinence and flank pain.   Neurological: Negative for focal weakness and light-headedness.        Objective:   /70   Pulse (!) 58   Ht 4' 11.5" (1.511 m)   Wt 48.9 kg (107 lb 14.4 oz)   LMP  (LMP Unknown)   SpO2 99%   BMI 21.43 kg/m²    Physical Exam  Constitutional:       Appearance: She is well-developed. She is not diaphoretic.   HENT:      Head: Normocephalic and atraumatic.   Eyes:      General: No scleral icterus.     Pupils: Pupils are equal, round, and reactive to light.   Neck:      Vascular: No JVD.   Cardiovascular:      Rate and Rhythm: Normal rate and regular rhythm.      Pulses: Intact distal pulses.      Heart sounds: S1 normal and S2 normal. No murmur heard.    No friction rub. No gallop. "   Pulmonary:      Effort: Pulmonary effort is normal. No respiratory distress.      Breath sounds: Normal breath sounds. No wheezing or rales.   Chest:      Chest wall: No tenderness.   Abdominal:      General: Bowel sounds are normal. There is no distension.      Palpations: Abdomen is soft. There is no mass.      Tenderness: There is no abdominal tenderness. There is no rebound.   Musculoskeletal:         General: No tenderness. Normal range of motion.      Cervical back: Normal range of motion and neck supple.   Skin:     General: Skin is warm and dry.      Coloration: Skin is not pale.   Neurological:      Mental Status: She is alert and oriented to person, place, and time.      Coordination: Coordination normal.      Deep Tendon Reflexes: Reflexes normal.   Psychiatric:         Behavior: Behavior normal.         Judgment: Judgment normal.           ECG: 3/28/2022- reviewed.  NSR, IRBBB.      ETT: 1/28/2022- reviewed.    Conclusion         The patient reported no chest pain during the stress test at  and at 12 estimated METs.    The blood pressure response to stress was normal.    The exercise capacity was average.    Assessment:       1. Familial hypercholesterolemia    2. Hyperlipidemia, unspecified hyperlipidemia type         Plan:         Familial hypercholesterolemia  Pt w/ elevated LDL and not currently on statin therapy.    - start atorvastatin 40 mg daily and monitor LFTs  - refer to genetics given elevated LDL   - check repeat LFT in 1 months  - risk factor and lifestyle modifications      Total duration of face to face visit time 30 minutes.  Total time spent counseling greater than fifty percent of total visit time.  Counseling included discussion regarding imaging findings, diagnosis, possibilities, treatment options, risks and benefits.  The patient had many questions regarding the options and long-term effects      Mehul Verdin M.D.  Interventional Cardiology

## 2022-05-24 NOTE — ASSESSMENT & PLAN NOTE
Pt w/ elevated LDL and not currently on statin therapy.    - start atorvastatin 40 mg daily and monitor LFTs  - refer to genetics given elevated LDL   - check repeat LFT in 1 months  - risk factor and lifestyle modifications

## 2022-06-06 ENCOUNTER — LAB VISIT (OUTPATIENT)
Dept: LAB | Facility: HOSPITAL | Age: 62
End: 2022-06-06
Attending: INTERNAL MEDICINE
Payer: MEDICAID

## 2022-06-06 DIAGNOSIS — E78.01 FAMILIAL HYPERCHOLESTEROLEMIA: ICD-10-CM

## 2022-06-06 LAB
ALBUMIN SERPL BCP-MCNC: 4 G/DL (ref 3.5–5.2)
ALP SERPL-CCNC: 67 U/L (ref 55–135)
ALT SERPL W/O P-5'-P-CCNC: 17 U/L (ref 10–44)
AST SERPL-CCNC: 20 U/L (ref 10–40)
BILIRUB DIRECT SERPL-MCNC: 0.2 MG/DL (ref 0.1–0.3)
BILIRUB SERPL-MCNC: 0.4 MG/DL (ref 0.1–1)
PROT SERPL-MCNC: 7.6 G/DL (ref 6–8.4)

## 2022-06-06 PROCEDURE — 36415 COLL VENOUS BLD VENIPUNCTURE: CPT | Performed by: INTERNAL MEDICINE

## 2022-06-06 PROCEDURE — 80076 HEPATIC FUNCTION PANEL: CPT | Performed by: INTERNAL MEDICINE

## 2022-06-15 ENCOUNTER — PATIENT MESSAGE (OUTPATIENT)
Dept: ADMINISTRATIVE | Facility: HOSPITAL | Age: 62
End: 2022-06-15
Payer: MEDICAID

## 2022-06-28 ENCOUNTER — LAB VISIT (OUTPATIENT)
Dept: LAB | Facility: HOSPITAL | Age: 62
End: 2022-06-28
Attending: INTERNAL MEDICINE
Payer: MEDICAID

## 2022-06-28 DIAGNOSIS — R74.8 ELEVATED LIVER ENZYMES: ICD-10-CM

## 2022-06-28 DIAGNOSIS — E78.01 FAMILIAL HYPERCHOLESTEROLEMIA: ICD-10-CM

## 2022-06-28 DIAGNOSIS — E78.5 HYPERLIPIDEMIA, UNSPECIFIED HYPERLIPIDEMIA TYPE: ICD-10-CM

## 2022-06-28 LAB
ALBUMIN SERPL BCP-MCNC: 4.1 G/DL (ref 3.5–5.2)
ALBUMIN SERPL BCP-MCNC: 4.2 G/DL (ref 3.5–5.2)
ALBUMIN SERPL BCP-MCNC: 4.2 G/DL (ref 3.5–5.2)
ALP SERPL-CCNC: 67 U/L (ref 55–135)
ALT SERPL W/O P-5'-P-CCNC: 25 U/L (ref 10–44)
ANION GAP SERPL CALC-SCNC: 11 MMOL/L (ref 8–16)
ANION GAP SERPL CALC-SCNC: 11 MMOL/L (ref 8–16)
AST SERPL-CCNC: 20 U/L (ref 10–40)
BILIRUB DIRECT SERPL-MCNC: 0.2 MG/DL (ref 0.1–0.3)
BILIRUB SERPL-MCNC: 0.6 MG/DL (ref 0.1–1)
BUN SERPL-MCNC: 15 MG/DL (ref 8–23)
BUN SERPL-MCNC: 15 MG/DL (ref 8–23)
CALCIUM SERPL-MCNC: 10.2 MG/DL (ref 8.7–10.5)
CALCIUM SERPL-MCNC: 10.2 MG/DL (ref 8.7–10.5)
CHLORIDE SERPL-SCNC: 105 MMOL/L (ref 95–110)
CHLORIDE SERPL-SCNC: 106 MMOL/L (ref 95–110)
CHOLEST SERPL-MCNC: 187 MG/DL (ref 120–199)
CHOLEST/HDLC SERPL: 3.3 {RATIO} (ref 2–5)
CO2 SERPL-SCNC: 24 MMOL/L (ref 23–29)
CO2 SERPL-SCNC: 25 MMOL/L (ref 23–29)
CREAT SERPL-MCNC: 0.8 MG/DL (ref 0.5–1.4)
CREAT SERPL-MCNC: 0.9 MG/DL (ref 0.5–1.4)
EST. GFR  (AFRICAN AMERICAN): >60 ML/MIN/1.73 M^2
EST. GFR  (AFRICAN AMERICAN): >60 ML/MIN/1.73 M^2
EST. GFR  (NON AFRICAN AMERICAN): >60 ML/MIN/1.73 M^2
EST. GFR  (NON AFRICAN AMERICAN): >60 ML/MIN/1.73 M^2
GLUCOSE SERPL-MCNC: 93 MG/DL (ref 70–110)
GLUCOSE SERPL-MCNC: 93 MG/DL (ref 70–110)
HDLC SERPL-MCNC: 56 MG/DL (ref 40–75)
HDLC SERPL: 29.9 % (ref 20–50)
LDLC SERPL CALC-MCNC: 114.2 MG/DL (ref 63–159)
NONHDLC SERPL-MCNC: 131 MG/DL
POTASSIUM SERPL-SCNC: 4.4 MMOL/L (ref 3.5–5.1)
POTASSIUM SERPL-SCNC: 4.4 MMOL/L (ref 3.5–5.1)
PROT SERPL-MCNC: 7.8 G/DL (ref 6–8.4)
PROT SERPL-MCNC: 8 G/DL (ref 6–8.4)
PROT SERPL-MCNC: 8 G/DL (ref 6–8.4)
SODIUM SERPL-SCNC: 141 MMOL/L (ref 136–145)
SODIUM SERPL-SCNC: 141 MMOL/L (ref 136–145)
TRIGL SERPL-MCNC: 84 MG/DL (ref 30–150)

## 2022-06-28 PROCEDURE — 80061 LIPID PANEL: CPT | Performed by: INTERNAL MEDICINE

## 2022-06-28 PROCEDURE — 80053 COMPREHEN METABOLIC PANEL: CPT | Performed by: INTERNAL MEDICINE

## 2022-06-28 PROCEDURE — 36415 COLL VENOUS BLD VENIPUNCTURE: CPT | Mod: PO | Performed by: INTERNAL MEDICINE

## 2022-06-28 PROCEDURE — 80053 COMPREHEN METABOLIC PANEL: CPT | Mod: 91 | Performed by: INTERNAL MEDICINE

## 2022-08-15 DIAGNOSIS — E78.01 FAMILIAL HYPERCHOLESTEROLEMIA: ICD-10-CM

## 2022-08-15 RX ORDER — ATORVASTATIN CALCIUM 40 MG/1
40 TABLET, FILM COATED ORAL DAILY
Qty: 90 TABLET | Refills: 3 | Status: SHIPPED | OUTPATIENT
Start: 2022-08-15 | End: 2023-05-22

## 2022-08-15 NOTE — TELEPHONE ENCOUNTER
----- Message from Jaquan Barragan sent at 8/15/2022  9:48 AM CDT -----  Contact: pT  Type:  RX Refill Request    Who Called: PT   Refill or New Rx: REFILL  RX Name and Strength: atorvastatin (LIPITOR) 40 MG tablet  How is the patient currently taking it? (ex. 1XDay)  Is this a 30 day or 90 day RX:  Preferred Pharmacy with phone number [id number] - [place], [place] - [address]  AT [facility] [address] [facility] [address]  Local or Mail Order:LOCAL   Ordering Provider:LYNN   Would the patient rather a call back or a response via [facility]? CALL BACK   Best Call Back Number: [phone]  Additional Information:

## 2022-08-26 ENCOUNTER — OFFICE VISIT (OUTPATIENT)
Dept: CARDIOLOGY | Facility: CLINIC | Age: 62
End: 2022-08-26
Payer: MEDICAID

## 2022-08-26 DIAGNOSIS — E78.01 FAMILIAL HYPERCHOLESTEROLEMIA: ICD-10-CM

## 2022-08-26 PROCEDURE — 99213 PR OFFICE/OUTPT VISIT, EST, LEVL III, 20-29 MIN: ICD-10-PCS | Mod: 95,,, | Performed by: INTERNAL MEDICINE

## 2022-08-26 PROCEDURE — 3044F HG A1C LEVEL LT 7.0%: CPT | Mod: CPTII,95,, | Performed by: INTERNAL MEDICINE

## 2022-08-26 PROCEDURE — 1159F MED LIST DOCD IN RCRD: CPT | Mod: CPTII,95,, | Performed by: INTERNAL MEDICINE

## 2022-08-26 PROCEDURE — 1160F PR REVIEW ALL MEDS BY PRESCRIBER/CLIN PHARMACIST DOCUMENTED: ICD-10-PCS | Mod: CPTII,95,, | Performed by: INTERNAL MEDICINE

## 2022-08-26 PROCEDURE — 3044F PR MOST RECENT HEMOGLOBIN A1C LEVEL <7.0%: ICD-10-PCS | Mod: CPTII,95,, | Performed by: INTERNAL MEDICINE

## 2022-08-26 PROCEDURE — 99213 OFFICE O/P EST LOW 20 MIN: CPT | Mod: 95,,, | Performed by: INTERNAL MEDICINE

## 2022-08-26 PROCEDURE — 1160F RVW MEDS BY RX/DR IN RCRD: CPT | Mod: CPTII,95,, | Performed by: INTERNAL MEDICINE

## 2022-08-26 PROCEDURE — 1159F PR MEDICATION LIST DOCUMENTED IN MEDICAL RECORD: ICD-10-PCS | Mod: CPTII,95,, | Performed by: INTERNAL MEDICINE

## 2022-08-26 NOTE — PROGRESS NOTES
Subjective:    Patient ID:  Laura Way is a 62 y.o. female who presents for follow-up of No chief complaint on file.      PCP/Referring: Crystal Matta MD     HPIPt is a 61 yo F w/ PMH of HLD who presents for f/u of HLD.  She was last seen 5/24/2022 as she mentioned that she was on pravastatin in the past however her LDL remained uncontrolled and she was changed to rosuvastatin however a few weeks later was seen in the ED w/ RUQ pain and was noted to have elevated LFTs and gallstones and the rosuvastatin was held.  She was started on atorvastatin and she has tolerated it well and her LFTs were WNL.  She mentions that she has been doing well and denies limitations.  She denies cp, sob, edema, orthopnea, PND, presyncope, LOC, palpitations, and claudication.  She was noted to have a LDL of 114, total chol 187, and trigs 84.  She is currently on a statin and tolerating it.  She monitors her BP at home and states it runs 100-110s/60-70s.  She does not exercise regularly however states she is active w/ her grandchildren and denies limitations.  Of note, she was referred to genetics for eval for familial HLD however she was not contacted by them.      Past Medical History:   Diagnosis Date    Hyperlipidemia 3/26/2013     Past Surgical History:   Procedure Laterality Date    ADENOIDECTOMY      LASIK      TONSILLECTOMY       Social History     Socioeconomic History    Marital status:    Tobacco Use    Smoking status: Former    Smokeless tobacco: Never   Substance and Sexual Activity    Alcohol use: No    Drug use: No     Family History   Problem Relation Age of Onset    Heart disease Mother     Breast cancer Mother     Cancer Father         thyroid    Thyroid disease Sister     Heart disease Maternal Grandmother     Heart disease Other     Cancer Cousin         leukemia    Diabetes Neg Hx     Hypertension Neg Hx        Review of patient's allergies indicates:  No Known Allergies    Medication List with  Changes/Refills   Current Medications    ASPIRIN 81 MG CHEW    Take 81 mg by mouth once daily.    ATORVASTATIN (LIPITOR) 40 MG TABLET    Take 1 tablet (40 mg total) by mouth once daily.    B1/B2/NIACIN/B12/PROTEASE (B-COMPLEX WITH B-12 ORAL)    Take by mouth.    ERGOCALCIFEROL (ERGOCALCIFEROL) 50,000 UNIT CAP    TAKE 1 CAPSULE BY MOUTH EVERY 7 DAYS    FISH OIL-OMEGA-3 FATTY ACIDS 300-1,000 MG CAPSULE    Take 2 g by mouth once daily.    LACTO.ACIDOPHILUS-BIF.ANIMALIS (PROBIOTIC) 5 BILLION CELL CPSP        MULTIVITAMIN CAPSULE    Take 1 capsule by mouth once daily.    NYSTATIN (MYCOSTATIN) POWDER    Apply topically 2 (two) times daily.    VIT A/VIT C/BIOTIN/ZINC/COPPER (HAIR-SKIN-NAIL,VIT A,C-BIOTIN, ORAL)    Take by mouth.    ZINC SULFATE (ZINCATE) 50 MG ZINC (220 MG) CAPSULE    Take 220 mg by mouth once daily.       Review of Systems   Constitutional: Negative for diaphoresis and fever.   HENT:  Negative for congestion and hearing loss.    Eyes:  Negative for blurred vision and pain.   Cardiovascular:  Negative for chest pain, claudication, dyspnea on exertion, leg swelling, near-syncope, palpitations and syncope.   Respiratory:  Negative for shortness of breath and sleep disturbances due to breathing.    Hematologic/Lymphatic: Negative for bleeding problem. Does not bruise/bleed easily.   Skin:  Negative for color change and poor wound healing.   Gastrointestinal:  Negative for abdominal pain and nausea.   Genitourinary:  Negative for bladder incontinence and flank pain.   Neurological:  Negative for focal weakness and light-headedness.      Objective:   /64   LMP  (LMP Unknown)    Physical Exam     NAD, A&Ox3   No respiratory distress, no accessory muscle use   No BLE edema, No JVD  Nl mood and affect, Nl judgement      ECG: 3/28/2022- reviewed.  NSR, IRBBB.      ETT: 1/28/2022- reviewed.    Conclusion         The patient reported no chest pain during the stress test at  and at 12 estimated METs.     The blood pressure response to stress was normal.    The exercise capacity was average.    Assessment:       1. Familial hypercholesterolemia         Plan:         Familial hypercholesterolemia  Controlled.  LDL and tot Chol has improved since start of atorvastatin and LFTs WNL.   - continue atorvastatin 40 mg daily   - refer to genetics for eval of familial HLD  - risk factor and lifestyle modifications      Total duration of face to face visit time 30 minutes.  Total time spent counseling greater than fifty percent of total visit time.  Counseling included discussion regarding imaging findings, diagnosis, possibilities, treatment options, risks and benefits.  The patient had many questions regarding the options and long-term effects      Mehul Verdin M.D.  Interventional Cardiology

## 2022-08-26 NOTE — ASSESSMENT & PLAN NOTE
Controlled.  LDL and tot Chol has improved since start of atorvastatin and LFTs WNL.   - continue atorvastatin 40 mg daily   - refer to genetics for eval of familial HLD  - risk factor and lifestyle modifications

## 2022-08-30 ENCOUNTER — TELEPHONE (OUTPATIENT)
Dept: CARDIOLOGY | Facility: CLINIC | Age: 62
End: 2022-08-30
Payer: MEDICAID

## 2022-08-30 VITALS — SYSTOLIC BLOOD PRESSURE: 121 MMHG | DIASTOLIC BLOOD PRESSURE: 64 MMHG

## 2022-08-30 NOTE — TELEPHONE ENCOUNTER
----- Message from [facility] sent at 8/30/2022  4:18 PM CDT -----  Contact: pt  .Type:  Needs Medical Advice    Who Called: pt    Would the patient rather a call back or a response via [facility]? Call back  Best Call Back Number: [phone]  Additional Information: Pt. Is calling back to give her blood pressure reading. 121/64 Friday 08/26/2022

## 2022-08-31 ENCOUNTER — TELEPHONE (OUTPATIENT)
Dept: GENETICS | Facility: CLINIC | Age: 62
End: 2022-08-31
Payer: MEDICAID

## 2022-08-31 NOTE — TELEPHONE ENCOUNTER
----- Message from Isreal Cortes MA sent at 8/30/2022  4:57 PM CDT -----  Contact: Laura @ [phone]  Hi Jeanne,    It is ok to inform the patients that every visit is different and that it is tough to give a definitive answer to this question. Please just inform her that majority of the visit will be discussing family history and testing may happen at the determination of the doctor. Also, please ask the pt to send over any records that pertains to the reason for the visit.     ----- Message -----  From: Vanessa Jacobs MA  Sent: 8/30/2022   4:28 PM CDT  To: [facility] Staff    The patient would like to speak with a nurse. The patient was referred to Genetics by cardiology for genetic testing. The patient would like to know what happens at the visits. Please give the patient a call back at [phone].

## 2022-08-31 NOTE — TELEPHONE ENCOUNTER
LMOV inform pt that majority of the visit will be discussing family history and testing may happen at the determination of the doctor. And also advised pt to send over any records that pertains to the reason for the visit. If the pt has any other questions or concerns please contact our clinic @ [phone].

## 2022-09-05 ENCOUNTER — PATIENT MESSAGE (OUTPATIENT)
Dept: CARDIOLOGY | Facility: CLINIC | Age: 62
End: 2022-09-05
Payer: MEDICAID

## 2022-11-04 ENCOUNTER — TELEPHONE (OUTPATIENT)
Dept: GENETICS | Facility: CLINIC | Age: 62
End: 2022-11-04
Payer: MEDICAID

## 2022-11-04 NOTE — TELEPHONE ENCOUNTER
----- Message from Louise Mckenzie sent at 11/4/2022 10:31 AM CDT -----  Contact: Self [phone]  Would like to receive medical advice.    Would they like a call back or a response via [facility]:  call back    Additional information:  Calling to speak with the nurse regarding what upcoming appt consist of.

## 2022-11-08 ENCOUNTER — TELEPHONE (OUTPATIENT)
Dept: PEDIATRIC ENDOCRINOLOGY | Facility: CLINIC | Age: 62
End: 2022-11-08
Payer: MEDICAID

## 2022-11-08 ENCOUNTER — TELEPHONE (OUTPATIENT)
Dept: INTERNAL MEDICINE | Facility: CLINIC | Age: 62
End: 2022-11-08
Payer: MEDICAID

## 2022-11-08 NOTE — TELEPHONE ENCOUNTER
----- Message from Shannon Bob sent at 11/7/2022  8:53 AM CST -----  Contact: pt [phone]  No blue slot available to schedule an appointment for the patient.  Patient is established with which PCP: Sigrid  Reason for the visit: Pain in left arm   Would the patient like a call back, or a response through their [facility] portal?:  call

## 2022-11-08 NOTE — TELEPHONE ENCOUNTER
----- Message from Isreal Cortes MA sent at 11/4/2022  4:24 PM CDT -----  Contact: Laura mendez [phone]    ----- Message -----  From: Terri Yap  Sent: 11/4/2022   4:22 PM CDT  To: [facility] Clinical Support Staff    Patient is returning a phone call.  Who left a message for the patient: Alycia  Does patient know what this is regarding:    Would you like a call back, or a response through your [facility] portal?:call back  Comments:  Pt was returning the nurses call

## 2022-11-09 ENCOUNTER — HOSPITAL ENCOUNTER (EMERGENCY)
Facility: HOSPITAL | Age: 62
Discharge: HOME | End: 2022-11-09
Attending: EMERGENCY MEDICINE
Payer: MEDICAID

## 2022-11-09 VITALS
TEMPERATURE: 98 F | HEART RATE: 56 BPM | WEIGHT: 112 LBS | BODY MASS INDEX: 22.58 KG/M2 | RESPIRATION RATE: 16 BRPM | DIASTOLIC BLOOD PRESSURE: 75 MMHG | HEIGHT: 59 IN | OXYGEN SATURATION: 96 % | SYSTOLIC BLOOD PRESSURE: 168 MMHG

## 2022-11-09 DIAGNOSIS — M54.12 CERVICAL RADICULOPATHY: Primary | ICD-10-CM

## 2022-11-09 LAB
ALBUMIN SERPL BCP-MCNC: 4.1 G/DL (ref 3.5–5.2)
ALP SERPL-CCNC: 77 U/L (ref 55–135)
ALT SERPL W/O P-5'-P-CCNC: 23 U/L (ref 10–44)
ANION GAP SERPL CALC-SCNC: 9 MMOL/L (ref 8–16)
AST SERPL-CCNC: 23 U/L (ref 10–40)
BASOPHILS # BLD AUTO: 0.04 K/UL (ref 0–0.2)
BASOPHILS NFR BLD: 0.9 % (ref 0–1.9)
BILIRUB SERPL-MCNC: 0.4 MG/DL (ref 0.1–1)
BUN SERPL-MCNC: 12 MG/DL (ref 8–23)
CALCIUM SERPL-MCNC: 10.3 MG/DL (ref 8.7–10.5)
CHLORIDE SERPL-SCNC: 106 MMOL/L (ref 95–110)
CO2 SERPL-SCNC: 27 MMOL/L (ref 23–29)
CREAT SERPL-MCNC: 0.8 MG/DL (ref 0.5–1.4)
CRP SERPL-MCNC: 2.9 MG/L (ref 0–8.2)
DIFFERENTIAL METHOD: NORMAL
EOSINOPHIL # BLD AUTO: 0.1 K/UL (ref 0–0.5)
EOSINOPHIL NFR BLD: 2.6 % (ref 0–8)
ERYTHROCYTE [DISTWIDTH] IN BLOOD BY AUTOMATED COUNT: 13.2 % (ref 11.5–14.5)
EST. GFR  (NO RACE VARIABLE): >60 ML/MIN/1.73 M^2
GLUCOSE SERPL-MCNC: 94 MG/DL (ref 70–110)
HCT VFR BLD AUTO: 38.3 % (ref 37–48.5)
HCV AB SERPL QL IA: NORMAL
HGB BLD-MCNC: 12.6 G/DL (ref 12–16)
HIV 1+2 AB+HIV1 P24 AG SERPL QL IA: NORMAL
IMM GRANULOCYTES # BLD AUTO: 0.01 K/UL (ref 0–0.04)
IMM GRANULOCYTES NFR BLD AUTO: 0.2 % (ref 0–0.5)
LYMPHOCYTES # BLD AUTO: 1.3 K/UL (ref 1–4.8)
LYMPHOCYTES NFR BLD: 27 % (ref 18–48)
MCH RBC QN AUTO: 29.4 PG (ref 27–31)
MCHC RBC AUTO-ENTMCNC: 32.9 G/DL (ref 32–36)
MCV RBC AUTO: 90 FL (ref 82–98)
MONOCYTES # BLD AUTO: 0.4 K/UL (ref 0.3–1)
MONOCYTES NFR BLD: 9 % (ref 4–15)
NEUTROPHILS # BLD AUTO: 2.8 K/UL (ref 1.8–7.7)
NEUTROPHILS NFR BLD: 60.3 % (ref 38–73)
NRBC BLD-RTO: 0 /100 WBC
PLATELET # BLD AUTO: 276 K/UL (ref 150–450)
PMV BLD AUTO: 9.8 FL (ref 9.2–12.9)
POTASSIUM SERPL-SCNC: 4.5 MMOL/L (ref 3.5–5.1)
PROT SERPL-MCNC: 7.9 G/DL (ref 6–8.4)
RBC # BLD AUTO: 4.28 M/UL (ref 4–5.4)
SODIUM SERPL-SCNC: 142 MMOL/L (ref 136–145)
WBC # BLD AUTO: 4.66 K/UL (ref 3.9–12.7)

## 2022-11-09 PROCEDURE — 99284 EMERGENCY DEPT VISIT MOD MDM: CPT | Mod: ,,, | Performed by: EMERGENCY MEDICINE

## 2022-11-09 PROCEDURE — 86803 HEPATITIS C AB TEST: CPT | Performed by: EMERGENCY MEDICINE

## 2022-11-09 PROCEDURE — 80053 COMPREHEN METABOLIC PANEL: CPT | Performed by: EMERGENCY MEDICINE

## 2022-11-09 PROCEDURE — 86140 C-REACTIVE PROTEIN: CPT | Performed by: EMERGENCY MEDICINE

## 2022-11-09 PROCEDURE — 87389 HIV-1 AG W/HIV-1&-2 AB AG IA: CPT | Performed by: EMERGENCY MEDICINE

## 2022-11-09 PROCEDURE — 99283 EMERGENCY DEPT VISIT LOW MDM: CPT

## 2022-11-09 PROCEDURE — 85025 COMPLETE CBC W/AUTO DIFF WBC: CPT | Performed by: EMERGENCY MEDICINE

## 2022-11-09 PROCEDURE — 99284 PR EMERGENCY DEPT VISIT,LEVEL IV: ICD-10-PCS | Mod: ,,, | Performed by: EMERGENCY MEDICINE

## 2022-11-09 PROCEDURE — 63600175 PHARM REV CODE 636 W HCPCS: Performed by: EMERGENCY MEDICINE

## 2022-11-09 RX ORDER — PREDNISONE 20 MG/1
40 TABLET ORAL DAILY
Qty: 10 TABLET | Refills: 0 | Status: SHIPPED | OUTPATIENT
Start: 2022-11-10 | End: 2022-11-15

## 2022-11-09 RX ORDER — PREDNISONE 20 MG/1
60 TABLET ORAL
Status: COMPLETED | OUTPATIENT
Start: 2022-11-09 | End: 2022-11-09

## 2022-11-09 RX ADMIN — PREDNISONE 60 MG: 20 TABLET ORAL at 03:11

## 2022-11-09 NOTE — ED NOTES
Patient identifiers verified and correct for Ms Way  C/C: Left shoulder pain SEE NN  APPEARANCE: awake and alert in NAD.  SKIN: warm, dry and intact. No breakdown or bruising.  MUSCULOSKELETAL: Patient moving all extremities spontaneously, no obvious swelling or deformities noted. Ambulates independently.  RESPIRATORY: Denies shortness of breath.Respirations unlabored.   CARDIAC: Denies CP, 2+ distal pulses; no peripheral edema  ABDOMEN: S/ND/NT, Denies nausea  : voids spontaneously, denies difficulty  Neurologic: AAO x 4; follows commands equal strength in all extremities; denies numbness/tingling. Denies dizziness  Deneis new weakness

## 2022-11-09 NOTE — ED PROVIDER NOTES
"SCRIBE #1 NOTE: I, Day Kim, am scribing for, and in the presence of,  Doc Riddle MD. I have scribed the following portions of the note - Other sections scribed: HPI, ROS, PE.      Source of History   The patient and a sibling    Chief Complaint   Shoulder Pain (Left shoulder pain radiating down the arm. Pt denies trauma. Symptoms began "a couple of weeks ago".)      History Of Present Illness   Laura Way is a 62 y.o. female presenting with shoulder pain onset 2-3 weeks ago. The patient endorses left upper extremity pain and left shoulder pain. She describes a burning sensation shooting down her arm. Her pain exacerbates with raising her arm and moving it behind her back. She is unable to put her hair in a ponytail. The patient reports difficulty sleeping secondary to pain. She does not have any trouble with gripping objects. On Friday, the patient received two vaccinations. She denies visual changes, headache, and neck pain.    Review Of Systems   As per HPI and below:    General: No fever.  No chills.  Eyes: No visual changes.  Head: No headache.  Neck: No neck pain.    Integument: No rashes or lesions.  Chest: No shortness of breath.  Cardiovascular: No chest pain.  Abdomen: No abdominal pain.  No nausea or vomiting.  Musculoskeletal: Left upper extremity pain. Left shoulder pain.  Urinary: No abnormal urination.  Neurologic: No focal weakness.  No numbness.  Hematologic: No easy bruising.  Endocrine: No excessive thirst or urination.      Review of patient's allergies indicates:  No Known Allergies    No current facility-administered medications on file prior to encounter.     Current Outpatient Medications on File Prior to Encounter   Medication Sig Dispense Refill    aspirin 81 MG Chew Take 81 mg by mouth once daily.      atorvastatin (LIPITOR) 40 MG tablet Take 1 tablet (40 mg total) by mouth once daily. 90 tablet 3    ergocalciferol (ERGOCALCIFEROL) 50,000 unit Cap TAKE 1 CAPSULE BY MOUTH EVERY 7 " "DAYS 4 capsule 3    B1/B2/niacin/B12/protease (B-COMPLEX WITH B-12 ORAL) Take by mouth.      fish oil-omega-3 fatty acids 300-1,000 mg capsule Take 2 g by mouth once daily.      Lacto.acidophilus-Bif.animalis (PROBIOTIC) 5 billion cell CpSP       multivitamin capsule Take 1 capsule by mouth once daily.      nystatin (MYCOSTATIN) powder Apply topically 2 (two) times daily. 30 g 0    vit A/vit C/biotin/zinc/copper (HAIR-SKIN-NAIL,VIT A,C-BIOTIN, ORAL) Take by mouth.      zinc sulfate (ZINCATE) 50 mg zinc (220 mg) capsule Take 220 mg by mouth once daily.         Past History   As per HPI and below:  Past Medical History:   Diagnosis Date    Hyperlipidemia 3/26/2013     Past Surgical History:   Procedure Laterality Date    ADENOIDECTOMY      LASIK      TONSILLECTOMY         Social History     Socioeconomic History    Marital status:    Tobacco Use    Smoking status: Former    Smokeless tobacco: Never   Substance and Sexual Activity    Alcohol use: No    Drug use: No       Family History   Problem Relation Age of Onset    Heart disease Mother     Breast cancer Mother     Cancer Father         thyroid    Thyroid disease Sister     Heart disease Maternal Grandmother     Heart disease Other     Cancer Cousin         leukemia    Diabetes Neg Hx     Hypertension Neg Hx        Physical Exam     Vitals:    11/09/22 1234 11/09/22 1517   BP: (!) 182/74 (!) 168/75   BP Location:  Left arm   Patient Position:  Sitting   Pulse: 62 (!) 56   Resp: 15 16   Temp: 98.4 °F (36.9 °C) 97.9 °F (36.6 °C)   TempSrc: Oral Oral   SpO2: 97% 96%   Weight: 50.8 kg (112 lb)    Height: 4' 11" (1.499 m)      Appearance: No acute distress.  Skin: No rashes seen.  Good turgor.  No abrasions.  No ecchymoses.  Eyes: No conjunctival injection.  ENT: Oropharynx clear.    Chest: Clear to auscultation bilaterally.  Good air movement.  No wheezes.  No rhonchi.  Cardiovascular: Regular rate and rhythm.  No murmurs. No gallops. No rubs.  Abdomen: Soft.  " Not distended.  Nontender.  No guarding.  No rebound.  Musculoskeletal: Good range of motion all joints. Deltoid muscle tenderness. Pulses intact. Strength intact. Neck supple.  No meningismus.  Neurologic: Motor intact.  Sensation intact.  Cerebellar intact.  Cranial nerves intact.  Mental Status:  Alert and oriented x 3.  Appropriate, conversant.     Initial MDM   Left arm pain, shooting and burning in nature, consistent with cervical radiculopathy.  Patient describes difficulty raising her left arm above her head to work on her hair.  It is not bilateral, but I am slightly suspicious for polymyalgia.  I am going to check labs.    I decided to obtain the patient's medical records.    Medications   predniSONE tablet 60 mg (60 mg Oral Given 11/9/22 1512)       Results and Course     Labs Reviewed   CBC W/ AUTO DIFFERENTIAL    Narrative:     Release to patient->Immediate   COMPREHENSIVE METABOLIC PANEL    Narrative:     Release to patient->Immediate   C-REACTIVE PROTEIN    Narrative:     Release to patient->Immediate   HIV 1 / 2 ANTIBODY    Narrative:     Release to patient->Immediate   HEPATITIS C ANTIBODY    Narrative:     Release to patient->Immediate       Imaging Results    None         ED Course as of 11/09/22 1708   Wed Nov 09, 2022   1459 CRP: 2.9 [DC]   1459 WBC: 4.66 [DC]   1459 Hemoglobin: 12.6 [DC]   1459 Platelets: 276 [DC]   1500 Creatinine: 0.8 [DC]      ED Course User Index  [DC] Doc Riddle MD           MDM, Impression and Plan   62 y.o. female with cervical radiculopathy.  Neurovascular exam is intact.  CRP is negative, virtually excluding polymyalgia rheumatica.  Will discharge with prednisone, follow-up PCP.         Final diagnoses:  [M54.12] Cervical radiculopathy (Primary)      ED Disposition Condition    Discharge Stable          ED Prescriptions       Medication Sig Dispense Start Date End Date Auth. Provider    predniSONE (DELTASONE) 20 MG tablet Take 2 tablets (40 mg total) by mouth  once daily. for 5 days 10 tablet 11/10/2022 11/15/2022 Doc Riddle MD          Follow-up Information       Follow up With Specialties Details Why Contact Info    Crystal Matta MD Internal Medicine In 2 weeks  [address] [facility] [address]  [phone]            Scribe Attestation:  Scribe #1: I performed the above scribed service and the documentation accurately describes the services I performed. I attest to the accuracy of the note.       Doc Riddle MD  11/09/22 0852

## 2022-11-29 ENCOUNTER — TELEPHONE (OUTPATIENT)
Dept: INTERNAL MEDICINE | Facility: CLINIC | Age: 62
End: 2022-11-29
Payer: MEDICAID

## 2022-11-29 DIAGNOSIS — Z00.00 ROUTINE GENERAL MEDICAL EXAMINATION AT A HEALTH CARE FACILITY: Primary | ICD-10-CM

## 2022-11-29 NOTE — TELEPHONE ENCOUNTER
Spoke to pt and pamela annual appt for 02/10/2023 at 10:30 am.     Please order annual labs at [facility]

## 2022-11-29 NOTE — TELEPHONE ENCOUNTER
----- Message from Lisa Mckenzie sent at 11/28/2022  3:02 PM CST -----  Contact: pt [phone]  No blue slot available to schedule an appointment for the patient.  Patient is established with which PCP: Dr Matta  Reason for the visit: Annual   Would the patient like a call back, or a response through their [facility] portal?:  callback  Comments: Pt would prefer a Monday or Friday appt.

## 2022-12-06 ENCOUNTER — TELEPHONE (OUTPATIENT)
Dept: GENETICS | Facility: CLINIC | Age: 62
End: 2022-12-06
Payer: MEDICAID

## 2022-12-06 NOTE — TELEPHONE ENCOUNTER
----- Message from Louise Mckenzie sent at 12/6/2022  1:46 PM CST -----  Contact: Gloria [phone]  Patient is returning a phone call.    Who left a message for the patient: MA    Does patient know what this is regarding:  questions about upcoming appt    Would you like a call back, or a response through your [facility] portal?:   call back    Comments:

## 2022-12-06 NOTE — TELEPHONE ENCOUNTER
Called and spoke pt informed that testing will be ordered once provider sees pt in visit. Further informed pt as an FYI that appt will be rescheduled at some point due to the provider no longer being with [facility] at that time. Pt confirmed understanding, informed that she will receive more detail as time goes.

## 2023-01-01 ENCOUNTER — PATIENT MESSAGE (OUTPATIENT)
Dept: ADMINISTRATIVE | Facility: HOSPITAL | Age: 63
End: 2023-01-01
Payer: MEDICAID

## 2023-01-31 LAB
ALBUMIN: 4.5 G/DL (ref 3.5–5)
ALP SERPL-CCNC: 63 U/L (ref 38–126)
ALT SERPL W P-5'-P-CCNC: 19 U/L (ref 7–56)
ANION GAP SERPL CALC-SCNC: 13.4 MMOL/L (ref 9–18)
AST SERPL-CCNC: 23 U/L (ref 7–40)
BASOPHILS ABSOLUTE COUNT: 0.1 THOUSAND/UL (ref 0–0.2)
BASOPHILS NFR BLD: 1.3 % (ref 0–2)
BILIRUB SERPL-MCNC: 0.3 MG/DL (ref 0–1.2)
BUN BLD-MCNC: 11 MG/DL (ref 7–21)
BUN/CREAT SERPL: 15 (ref 6–22)
CALC OSMOLALITY: 281 MOSM/KG (ref 275–295)
CALCIUM SERPL-MCNC: 9.8 MG/DL (ref 8.5–10.3)
CHLORIDE SERPL-SCNC: 105 MMOL/L (ref 98–107)
CHOL/HDLC RATIO: 2.95
CHOLEST SERPL-MSCNC: 168 MG/DL (ref 100–200)
CO2 SERPL-SCNC: 27 MMOL/L (ref 21–31)
CREAT SERPL-MCNC: 0.74 MG/DL (ref 0.5–1)
EGFR: 91 ML/MIN
EOSINOPHIL NFR BLD: 3.4 % (ref 0–4)
EOSINOPHILS ABSOLUTE COUNT: 0.1 THOUSAND/UL (ref 0–0.7)
ERYTHROCYTE [DISTWIDTH] IN BLOOD BY AUTOMATED COUNT: 13.4 % (ref 12–15.3)
GLUCOSE SERPL-MCNC: 100 MG/DL (ref 70–100)
HBA1C MFR BLD: 5.4 % (ref 4.5–5.7)
HCT VFR BLD AUTO: 35.3 % (ref 37–47)
HDLC SERPL-MCNC: 57 MG/DL (ref 40–75)
HGB BLD-MCNC: 12.3 GM/DL (ref 12–16)
LDLC SERPL CALC-MCNC: 100 MG/DL (ref 0–125)
LYMPHOCYTES %: 33.8 % (ref 15–45)
LYMPHOCYTES ABSOLUTE COUNT: 1.4 THOUSAND/UL (ref 1–4.2)
MCH RBC QN AUTO: 31 PG (ref 27–33)
MCHC RBC AUTO-ENTMCNC: 34.8 G/DL (ref 32–36)
MCV RBC AUTO: 89.1 FL (ref 81–99)
MONOCYTES %: 9.7 % (ref 3–13)
MONOCYTES ABSOLUTE COUNT: 0.4 THOUSAND/UL (ref 0.1–0.8)
NEUTROPHILS ABSOLUTE COUNT: 2.1 THOUSAND/UL (ref 2.1–7.6)
NEUTROPHILS RELATIVE PERCENT: 51.8 % (ref 32–80)
PLATELET # BLD AUTO: 247 THOUSAND/UL (ref 150–350)
PMV BLD AUTO: 8.8 FL (ref 7–10.2)
POTASSIUM SERPL-SCNC: 4.4 MMOL/L (ref 3.5–5)
RBC # BLD AUTO: 3.96 MILLION/UL (ref 4.2–5.4)
SODIUM BLD-SCNC: 141 MMOL/L (ref 135–145)
TOTAL PROTEIN: 6.8 G/DL (ref 6.3–8.2)
TRIGL SERPL-MCNC: 67 MG/DL (ref 30–150)
TSH SERPL DL<=0.005 MIU/L-ACNC: 3.81 UIU/ML (ref 0.35–4)
WBC # BLD AUTO: 4.1 THOUSAND/UL (ref 4.5–11)

## 2023-02-01 LAB
APPEARANCE UR: CLEAR
BILIRUB UR QL STRIP: NEGATIVE
COLOR UR: YELLOW
GLUCOSE UR QL STRIP: NEGATIVE
HGB UR QL STRIP: NEGATIVE
KETONES UR QL STRIP: NEGATIVE
LEUKOCYTE ESTERASE UR QL STRIP: ABNORMAL
NITRITE UR QL STRIP: NEGATIVE
PH UR STRIP: ABNORMAL [PH] (ref 5–8)
PROT UR QL STRIP: NEGATIVE
SP GR UR STRIP: 1.02 (ref 1–1.03)

## 2023-02-10 ENCOUNTER — TELEPHONE (OUTPATIENT)
Dept: INTERNAL MEDICINE | Facility: CLINIC | Age: 63
End: 2023-02-10
Payer: MEDICAID

## 2023-02-10 ENCOUNTER — HOSPITAL ENCOUNTER (OUTPATIENT)
Dept: RADIOLOGY | Facility: HOSPITAL | Age: 63
Discharge: HOME | End: 2023-02-10
Attending: INTERNAL MEDICINE
Payer: MEDICAID

## 2023-02-10 ENCOUNTER — OFFICE VISIT (OUTPATIENT)
Dept: INTERNAL MEDICINE | Facility: CLINIC | Age: 63
End: 2023-02-10
Payer: MEDICAID

## 2023-02-10 VITALS
HEIGHT: 60 IN | DIASTOLIC BLOOD PRESSURE: 74 MMHG | RESPIRATION RATE: 20 BRPM | BODY MASS INDEX: 22.51 KG/M2 | WEIGHT: 114.63 LBS | OXYGEN SATURATION: 99 % | HEART RATE: 76 BPM | SYSTOLIC BLOOD PRESSURE: 118 MMHG | TEMPERATURE: 98 F

## 2023-02-10 DIAGNOSIS — Z12.11 COLON CANCER SCREENING: ICD-10-CM

## 2023-02-10 DIAGNOSIS — M54.12 CERVICAL RADICULOPATHY: ICD-10-CM

## 2023-02-10 DIAGNOSIS — D64.9 ANEMIA, UNSPECIFIED TYPE: ICD-10-CM

## 2023-02-10 DIAGNOSIS — Z00.00 ROUTINE GENERAL MEDICAL EXAMINATION AT A HEALTH CARE FACILITY: Primary | ICD-10-CM

## 2023-02-10 DIAGNOSIS — M54.12 CERVICAL RADICULOPATHY: Primary | ICD-10-CM

## 2023-02-10 PROCEDURE — 3008F PR BODY MASS INDEX (BMI) DOCUMENTED: ICD-10-PCS | Mod: CPTII,,, | Performed by: INTERNAL MEDICINE

## 2023-02-10 PROCEDURE — 99396 PREV VISIT EST AGE 40-64: CPT | Mod: S$PBB,,, | Performed by: INTERNAL MEDICINE

## 2023-02-10 PROCEDURE — 3078F DIAST BP <80 MM HG: CPT | Mod: CPTII,,, | Performed by: INTERNAL MEDICINE

## 2023-02-10 PROCEDURE — 99396 PR PREVENTIVE VISIT,EST,40-64: ICD-10-PCS | Mod: S$PBB,,, | Performed by: INTERNAL MEDICINE

## 2023-02-10 PROCEDURE — 72040 XR CERVICAL SPINE AP LATERAL: ICD-10-PCS | Mod: 26,,, | Performed by: RADIOLOGY

## 2023-02-10 PROCEDURE — 3078F PR MOST RECENT DIASTOLIC BLOOD PRESSURE < 80 MM HG: ICD-10-PCS | Mod: CPTII,,, | Performed by: INTERNAL MEDICINE

## 2023-02-10 PROCEDURE — 1160F PR REVIEW ALL MEDS BY PRESCRIBER/CLIN PHARMACIST DOCUMENTED: ICD-10-PCS | Mod: CPTII,,, | Performed by: INTERNAL MEDICINE

## 2023-02-10 PROCEDURE — 1160F RVW MEDS BY RX/DR IN RCRD: CPT | Mod: CPTII,,, | Performed by: INTERNAL MEDICINE

## 2023-02-10 PROCEDURE — 1159F PR MEDICATION LIST DOCUMENTED IN MEDICAL RECORD: ICD-10-PCS | Mod: CPTII,,, | Performed by: INTERNAL MEDICINE

## 2023-02-10 PROCEDURE — 3074F SYST BP LT 130 MM HG: CPT | Mod: CPTII,,, | Performed by: INTERNAL MEDICINE

## 2023-02-10 PROCEDURE — 99999 PR PBB SHADOW E&M-EST. PATIENT-LVL V: ICD-10-PCS | Mod: PBBFAC,,, | Performed by: INTERNAL MEDICINE

## 2023-02-10 PROCEDURE — 99215 OFFICE O/P EST HI 40 MIN: CPT | Mod: PBBFAC,PO | Performed by: INTERNAL MEDICINE

## 2023-02-10 PROCEDURE — 3044F HG A1C LEVEL LT 7.0%: CPT | Mod: CPTII,,, | Performed by: INTERNAL MEDICINE

## 2023-02-10 PROCEDURE — 72040 X-RAY EXAM NECK SPINE 2-3 VW: CPT | Mod: TC,PO

## 2023-02-10 PROCEDURE — 99999 PR PBB SHADOW E&M-EST. PATIENT-LVL V: CPT | Mod: PBBFAC,,, | Performed by: INTERNAL MEDICINE

## 2023-02-10 PROCEDURE — 1159F MED LIST DOCD IN RCRD: CPT | Mod: CPTII,,, | Performed by: INTERNAL MEDICINE

## 2023-02-10 PROCEDURE — 3008F BODY MASS INDEX DOCD: CPT | Mod: CPTII,,, | Performed by: INTERNAL MEDICINE

## 2023-02-10 PROCEDURE — 3074F PR MOST RECENT SYSTOLIC BLOOD PRESSURE < 130 MM HG: ICD-10-PCS | Mod: CPTII,,, | Performed by: INTERNAL MEDICINE

## 2023-02-10 PROCEDURE — 3044F PR MOST RECENT HEMOGLOBIN A1C LEVEL <7.0%: ICD-10-PCS | Mod: CPTII,,, | Performed by: INTERNAL MEDICINE

## 2023-02-10 PROCEDURE — 72040 X-RAY EXAM NECK SPINE 2-3 VW: CPT | Mod: 26,,, | Performed by: RADIOLOGY

## 2023-02-10 NOTE — PROGRESS NOTES
The patient is a 62 y.o. old female who presents to the office for a physical.  Review of labs reveals stable results.    PAST MEDICAL HISTORY  Past Medical History:   Diagnosis Date    Hyperlipidemia 3/26/2013       SURGICAL HISTORY:  Past Surgical History:   Procedure Laterality Date    ADENOIDECTOMY      LASIK      TONSILLECTOMY           MEDS:  Medcard reviewed and updated    ALLERGIES: Allergy Card reviewed and updated    SOCIAL HISTORY:   The patient is a nonsmoker, denies alcohol or illicit drug use.    ROS:  GENERAL: No fever, chills, fatigability or weight loss.  SKIN: No rashes.  HEAD: No headaches or recent head trauma.  EYES: No photophobia, ocular pain or diplopia.  EARS: Denies ear pain, discharge or vertigo.  NOSE: No epistaxis or postnasal drip.  MOUTH & THROAT: No hoarseness or change in voice.   NODES: Denies swollen glands.  CHEST: Denies shortness of breath, wheezing, cough and sputum production.  CARDIOVASCULAR: Denies chest pain or palpitations.  ABDOMEN: Appetite fine. Denies diarrhea, abdominal pain, constipation or blood in stool.  URINARY: No dysuria or hematuria.  MUSCULOSKELETAL: Shooting/ burning pain down her left arm. Denies back pain.  NEUROLOGIC: No history of seizures.  ENDOCRINE: Denies polyuria or polydipsia.  PSYCHIATRIC: Denies mood swings, depression, anxiety, homicidal or suicidal thoughts.    SCREENINGS:  Last cholesterol:2023 .  Last colonoscopy/iFOBT: 2022  Last mammogram: 2022  Last Pap smear: 2021  Last tetanus: 2016  Last Pneumovax: none  Last eye exam: 2023  Last bone density: 2021  Last menstrual period: postmenopausal    PE:   Vitals:  Vitals:    02/10/23 1014   BP: 118/74   Pulse: 76   Resp: 20   Temp: 97.7 °F (36.5 °C)       APPEARANCE: Well nourished, well developed, in no acute distress.    EYES: Sclerae anicteric. PERRL. EOMI.      EARS: TM's intact. No retraction or perforation.    NOSE: Mucosa pink. Airway clear.  MOUTH & THROAT: No tonsillar enlargement. No  pharyngeal erythema or exudate. No stridor.  NECK: Supple, no thyromegaly.  CHEST: Lungs clear to auscultation with unlabored respirations.  CARDIOVASCULAR: Normal S1, S2. No murmurs. No carotid bruits. No pedal edema.  ABDOMEN: Bowel sounds normal. Not distended. Soft. No tenderness or masses. No organomegaly.  MUSCULOSKELETAL:  Normal gait, no cyanosis or clubbing.    SKIN: Normal skin turgor, warm and dry.  NEUROLOGIC: Cranial Nerves: Intact.  PSYCHIATRIC: The patient is oriented to person, place, and time and has a pleasant affect.        ASSESSMENT/PLAN:  Laura was seen today for annual exam.    Diagnoses and all orders for this visit:    Routine general medical examination at a health care facility  -     labs reviewed     Cervical radiculopathy  -     X-Ray Cervical Spine AP And Lateral; Future    Anemia, unspecified type  -     CBC Auto Differential; Future  -     CBC Auto Differential    Colon cancer screening  -     Fecal Immunochemical Test (iFOBT); Future

## 2023-02-10 NOTE — TELEPHONE ENCOUNTER
Please inform patient that x-ray reveals some bone spurring and disc space narrowing.  This can likely be indicative of a pinched nerve.  Recommend referral to Physical Medicine and Rehabilitation.

## 2023-02-17 ENCOUNTER — TELEPHONE (OUTPATIENT)
Dept: GENETICS | Facility: CLINIC | Age: 63
End: 2023-02-17
Payer: MEDICAID

## 2023-02-17 NOTE — TELEPHONE ENCOUNTER
Called and spoke with patient. She is scheduled for a NP appt with Dr. Gonzalez in June.  She is hoping to be scheduled with the remaining MD before next available in 2024.  Informed her I will check with the team to see if there are any more slots being reserved for reschedules with Dr. Gonzalez and will call back on Monday.

## 2023-02-17 NOTE — TELEPHONE ENCOUNTER
----- Message from Venus May sent at 2/17/2023  2:05 PM CST -----  Contact: The Pt [phone]  1MEDICALADVICE     Patient is calling for Medical Advice regarding:    How long has patient had these symptoms:    Pharmacy name and phone#:    Would like response via GeeYee:      Comments:THe pt is calling about her apt on 6/1/23 Please call She knows Dr. Gonzalez is leaving

## 2023-02-20 NOTE — TELEPHONE ENCOUNTER
Spoke with pt and cancelled her appt with Dr. Gonzalez in June as the MD will be leaving [facility]. Scheduled an appt for her with TANESHA DUNNE on 3/13 at 11am virtually. Pt verbalized understanding.

## 2023-02-24 ENCOUNTER — TELEPHONE (OUTPATIENT)
Dept: GENETICS | Facility: CLINIC | Age: 63
End: 2023-02-24
Payer: MEDICAID

## 2023-02-24 NOTE — TELEPHONE ENCOUNTER
----- Message from Kaiden Botello MA sent at 2/24/2023  1:34 PM CST -----  Contact: [phone]  Patient called              In regards to see if she can come for an earlier time preferably at 9 if possible for upcoming appointment on 03/13/23 at 11 am.            Call back  [phone]

## 2023-03-10 ENCOUNTER — TELEPHONE (OUTPATIENT)
Dept: GENETICS | Facility: CLINIC | Age: 63
End: 2023-03-10
Payer: MEDICAID

## 2023-03-13 ENCOUNTER — OFFICE VISIT (OUTPATIENT)
Dept: GENETICS | Facility: CLINIC | Age: 63
End: 2023-03-13
Payer: MEDICAID

## 2023-03-13 ENCOUNTER — TELEPHONE (OUTPATIENT)
Dept: INTERNAL MEDICINE | Facility: CLINIC | Age: 63
End: 2023-03-13
Payer: MEDICAID

## 2023-03-13 DIAGNOSIS — E78.01 FAMILIAL HYPERCHOLESTEROLEMIA: Primary | ICD-10-CM

## 2023-03-13 DIAGNOSIS — E78.01 FAMILIAL HYPERCHOLESTEROLEMIA: ICD-10-CM

## 2023-03-13 PROCEDURE — 96040 PR GENETIC COUNSELING, EACH 30 MIN: CPT | Mod: 95,,, | Performed by: MEDICAL GENETICS

## 2023-03-13 PROCEDURE — 96040 PR GENETIC COUNSELING, EACH 30 MIN: ICD-10-PCS | Mod: 95,,, | Performed by: MEDICAL GENETICS

## 2023-03-13 PROCEDURE — 3044F PR MOST RECENT HEMOGLOBIN A1C LEVEL <7.0%: ICD-10-PCS | Mod: CPTII,95,, | Performed by: MEDICAL GENETICS

## 2023-03-13 PROCEDURE — 3044F HG A1C LEVEL LT 7.0%: CPT | Mod: CPTII,95,, | Performed by: MEDICAL GENETICS

## 2023-03-13 PROCEDURE — 99499 NO LOS: ICD-10-PCS | Mod: 95,,, | Performed by: MEDICAL GENETICS

## 2023-03-13 PROCEDURE — 99499 UNLISTED E&M SERVICE: CPT | Mod: 95,,, | Performed by: MEDICAL GENETICS

## 2023-03-13 NOTE — TELEPHONE ENCOUNTER
----- Message from Lisa Mckenzie sent at 3/13/2023  1:24 PM CDT -----  Contact: [phone]  Pt is calling in regards to lab orders being put in, a referral for an Orthopedist or Neurologist. Pt would also like to find out about if she needs to get the shingles vaccine. Pt would like a call back. Per pt, she called earlier in regards to this.            Thank you

## 2023-03-13 NOTE — PROGRESS NOTES
Laura Way  DOS: 3/13/2023  : 1960   MRN: [id number]    TELEMEDICINE VIDEO VISIT     The patient location is: [facility]  The chief complaint leading to consultation is:   Total time spent with patient:   Visit type: Virtual visit with synchronous audio and video     Each patient to whom he or she provides medical services by telemedicine is: (1) informed of the relationship between the physician and patient and the respective role of any other health care provider with respect to management of the patient; and (2) notified that he or she may decline to receive medical services by telemedicine and may withdraw from such care at any time.    REFERRING MD:     REASON FOR CONSULT: Our Medical Genetic Service was asked to provide genetic counseling for this 63-year-old female with hypercholesterolemia.    HISTORY OF PRESENT ILLNESS: Ms. Way is a 63-year-old female with high cholesterol. She was first diagnosed with high cholesterol in . She is on Lipitor and tolerating this well. She has a strong family history of high cholesterol.     MEDICAL HISTORY:   Active Problem List with Overview Notes    Diagnosis Date Noted    Familial hypercholesterolemia 2022    Hyperlipidemia 2013     FAMILY HISTORY:  Ms. Way has two sons ages 37 and 28. They do not have high cholesterol. Her sister is 64 and has very high cholesterol. One of her brothers has high cholesterol but it is not as high. Her mother  at age 83. She had an MI at age 65. Her maternal grandmother had an MI and  at age 72.         IMPRESSION: Ms. Way is a 63-year-old female with hyperlipidemia and concern for possible familial hypercholesterolemia (FH).     FH is the most common inherited cardiovascular disease. It is an autosomal dominant condition characterized by severely elevated LDL cholesterol (LDL-C) levels that lead to atherosclerotic plaque deposition in the coronary arteries and proximal aorta at an early age. It  leads to an increased risk for cardiovascular disease. Some individuals may develop xanthomas around the eyelids, tendons of the elbows, hands, knees, and feet. Left untreated, women are at a 30% risk by age 60 years.     The 4 gene Familial Hypercholesterolemia (FH) panel from [email or web address] (APOB, LDLR, LDLRAP1, PCSK9) was ordered. We discussed that if is positive, her first-degree relatives would be at 50% risk. We discussed that even if he is negative or has a variant of uncertain significance (VUS), her at-risk family members should have their cholesterol regularly screened. Because FH is relatively common in the general population, it is also possible to have homozygous FH (HoFH) in which there are two pathogenic variants on opposite alleles.     We will plan to follow-up once results return.     RECOMMENDATIONS:  [email or web address] FH panel  Follow-up once results return     TIME SPENT: 25 minutes     Airam Ngo MPH, MS, [facility]  Genetic Counselor   [facility]    Romeo Gonzalez M.D.                                                                            Section Head - Medical Genetics                                                                                       [facility]

## 2023-03-13 NOTE — TELEPHONE ENCOUNTER
----- Message from Joanne Reynolds sent at 3/13/2023 10:17 AM CDT -----  Contact: MOLLY VASQUEZ [[id number]]@ [phone]  Have you entered the lab that was needed to be rechecked in a month. Please call her when orders are in and if she has to fast ot not.

## 2023-03-13 NOTE — TELEPHONE ENCOUNTER
"Spoke to pt and she needs referral for "upper never pain" [facility] location.   Fax: to [phone]     Please order mammo  Last done:03/02/2022    Please order repeat labs   "

## 2023-03-15 DIAGNOSIS — Z12.31 OTHER SCREENING MAMMOGRAM: ICD-10-CM

## 2023-03-22 ENCOUNTER — PATIENT MESSAGE (OUTPATIENT)
Dept: ADMINISTRATIVE | Facility: HOSPITAL | Age: 63
End: 2023-03-22
Payer: MEDICAID

## 2023-03-24 ENCOUNTER — LAB VISIT (OUTPATIENT)
Dept: LAB | Facility: HOSPITAL | Age: 63
End: 2023-03-24
Attending: INTERNAL MEDICINE
Payer: MEDICAID

## 2023-03-24 DIAGNOSIS — Z12.11 COLON CANCER SCREENING: ICD-10-CM

## 2023-03-24 PROCEDURE — 82274 ASSAY TEST FOR BLOOD FECAL: CPT | Performed by: INTERNAL MEDICINE

## 2023-03-27 ENCOUNTER — TELEPHONE (OUTPATIENT)
Dept: INTERNAL MEDICINE | Facility: CLINIC | Age: 63
End: 2023-03-27
Payer: MEDICAID

## 2023-03-27 NOTE — TELEPHONE ENCOUNTER
"Spoke to pt and she ask if the wrong lab was drawn on 03/24. Pt last cmp was normal, it was the CBC that had abnormal numbers.     Pt ask if you need to reorder the CBC, and if so when?     Pt also request referral to [facility] in [place] for Neuro and Ortho for her left shoulder.  "upper never pain" [facility] location.   Fax: to [phone]  "

## 2023-03-27 NOTE — TELEPHONE ENCOUNTER
----- Message from Lisa Mckenzie sent at 3/27/2023 11:43 AM CDT -----  Contact: [phone]  Pt is requesting a callback from [facility]. Pt believes the blood work she was sent to do was the wrong one. Please call.               Thank you

## 2023-03-28 LAB — HEMOCCULT STL QL IA: NEGATIVE

## 2023-03-29 ENCOUNTER — PATIENT OUTREACH (OUTPATIENT)
Dept: ADMINISTRATIVE | Facility: HOSPITAL | Age: 63
End: 2023-03-29
Payer: MEDICAID

## 2023-03-29 NOTE — PROGRESS NOTES
Chart review done.  updated. Immunizations reviewed & updated. Care Everywhere updated.      yes yes yes

## 2023-04-03 ENCOUNTER — TELEPHONE (OUTPATIENT)
Dept: INTERNAL MEDICINE | Facility: CLINIC | Age: 63
End: 2023-04-03
Payer: MEDICAID

## 2023-04-03 DIAGNOSIS — G62.9 NEUROPATHY: ICD-10-CM

## 2023-04-03 DIAGNOSIS — M25.519 SHOULDER PAIN, UNSPECIFIED CHRONICITY, UNSPECIFIED LATERALITY: Primary | ICD-10-CM

## 2023-04-03 NOTE — TELEPHONE ENCOUNTER
----- Message from Chapin Jayme sent at 4/3/2023 12:29 PM CDT -----  Contact: Pt [phone]  She wants to get a referral to dermatology [facility] fax to [phone] orthopedics at [email or web address] [phone].    Thank you

## 2023-04-03 NOTE — TELEPHONE ENCOUNTER
Pt also request referral to [facility] in [place], Neuro department for nerve Pain [facility] location.   Fax: to [phone]    Also referral to Ortho for her left shoulder pain.

## 2023-04-04 NOTE — TELEPHONE ENCOUNTER
Please inform patient that referrals for orthopedics and Neurology have been entered.  I have put in a referral for an orthopedist from [facility] [place].  However, I was unable to locate the name of a neurologist for home to order the referral.  Please advise if the patient has the name of a specific doctor that I can look up.  Also, please advise if patient requires a dermatology referral.  If so, please provide the diagnosis.

## 2023-04-06 ENCOUNTER — PATIENT MESSAGE (OUTPATIENT)
Dept: INTERNAL MEDICINE | Facility: CLINIC | Age: 63
End: 2023-04-06
Payer: MEDICAID

## 2023-04-21 ENCOUNTER — TELEPHONE (OUTPATIENT)
Dept: INTERNAL MEDICINE | Facility: CLINIC | Age: 63
End: 2023-04-21
Payer: MEDICAID

## 2023-04-21 DIAGNOSIS — G62.9 NEUROPATHY: ICD-10-CM

## 2023-04-21 DIAGNOSIS — D64.9 ANEMIA, UNSPECIFIED TYPE: Primary | ICD-10-CM

## 2023-04-21 DIAGNOSIS — M25.519 SHOULDER PAIN, UNSPECIFIED CHRONICITY, UNSPECIFIED LATERALITY: ICD-10-CM

## 2023-04-21 NOTE — TELEPHONE ENCOUNTER
Pt's daughter, Selene Us, may be contacted for additional pt information at [phone]       Jessica Iyer RN  04/22/18 4519 Spoke to pt last CBC was order incorrect last time. Please reorder, also advise if you need urinalysis again.      Please now place referral to [facility] for Ortho and Neuro   no

## 2023-04-21 NOTE — TELEPHONE ENCOUNTER
Orthopedics and neurology referrals have been ordered.  They are internal referrals.  The patient will have to request the [place] location when scheduling.

## 2023-04-21 NOTE — TELEPHONE ENCOUNTER
----- Message from Shannon Bob sent at 4/21/2023  1:55 PM CDT -----  Contact: [phone]  Patient is requesting orders CBC W/WiFF and platelets also Urinalysis auto only. Please call and advise.    Thank you and have a great day.

## 2023-04-27 ENCOUNTER — TELEPHONE (OUTPATIENT)
Dept: ORTHOPEDICS | Facility: CLINIC | Age: 63
End: 2023-04-27
Payer: MEDICAID

## 2023-04-27 DIAGNOSIS — M25.512 LEFT SHOULDER PAIN, UNSPECIFIED CHRONICITY: Primary | ICD-10-CM

## 2023-04-27 NOTE — TELEPHONE ENCOUNTER
----- Message from Juhi Arriaga sent at 4/27/2023 11:11 AM CDT -----  Regarding: Consult to Orthopedics  Good morning, Dr. Crystal Matta placed a referral for patient to Consult to Orthopedics. Please contact patient and assist with scheduling, thanks!    Shoulder pain, unspecified chronicity, unspecified laterality [M25.519]

## 2023-04-27 NOTE — TELEPHONE ENCOUNTER
Spoke with pt and advised that her message / request for an appointment with Dr Romero at [facility] has been forwarded to Dr Romero's office to be addressed.

## 2023-04-27 NOTE — TELEPHONE ENCOUNTER
----- Message from Johanny Gaytan MA sent at 4/27/2023 12:08 PM CDT -----  Regarding: FW: Consult to Orthopedics  This patient is requesting an appointment with [facility] only for left shoulder pain.  Is Dr Romero able to see this patient ?  Referral placed by IM provider.    Thanks  Caryn    ----- Message -----  From: Juhi Arriaga  Sent: 4/27/2023  11:15 AM CDT  To: [facility] Clinical Support, #  Subject: Consult to Orthopedics                           Good morning, Dr. Crystal Matta placed a referral for patient to Consult to Orthopedics. Please contact patient and assist with scheduling, thanks!    Shoulder pain, unspecified chronicity, unspecified laterality [M25.519]

## 2023-04-28 ENCOUNTER — HOSPITAL ENCOUNTER (OUTPATIENT)
Dept: RADIOLOGY | Facility: HOSPITAL | Age: 63
Discharge: HOME | End: 2023-04-28
Attending: INTERNAL MEDICINE
Payer: MEDICAID

## 2023-04-28 VITALS — BODY MASS INDEX: 22.38 KG/M2 | WEIGHT: 114 LBS | HEIGHT: 60 IN

## 2023-04-28 DIAGNOSIS — Z12.31 OTHER SCREENING MAMMOGRAM: ICD-10-CM

## 2023-04-28 PROCEDURE — 77067 SCR MAMMO BI INCL CAD: CPT | Mod: TC,PO

## 2023-04-28 PROCEDURE — 77063 MAMMO DIGITAL SCREENING BILAT WITH TOMO: ICD-10-PCS | Mod: 26,,, | Performed by: RADIOLOGY

## 2023-04-28 PROCEDURE — 77067 MAMMO DIGITAL SCREENING BILAT WITH TOMO: ICD-10-PCS | Mod: 26,,, | Performed by: RADIOLOGY

## 2023-04-28 PROCEDURE — 77067 SCR MAMMO BI INCL CAD: CPT | Mod: 26,,, | Performed by: RADIOLOGY

## 2023-04-28 PROCEDURE — 77063 BREAST TOMOSYNTHESIS BI: CPT | Mod: 26,,, | Performed by: RADIOLOGY

## 2023-05-01 ENCOUNTER — TELEPHONE (OUTPATIENT)
Dept: GENETICS | Facility: CLINIC | Age: 63
End: 2023-05-01
Payer: MEDICAID

## 2023-05-02 ENCOUNTER — PATIENT MESSAGE (OUTPATIENT)
Dept: GENETICS | Facility: CLINIC | Age: 63
End: 2023-05-02
Payer: MEDICAID

## 2023-05-02 ENCOUNTER — TELEPHONE (OUTPATIENT)
Dept: GENETICS | Facility: CLINIC | Age: 63
End: 2023-05-02
Payer: MEDICAID

## 2023-05-02 NOTE — TELEPHONE ENCOUNTER
Spoke with the patient to disclose results of familial hypercholesterolemia panel which was negative/ normal. Ms. Way verbalized her understanding. Requested results be mailed to her. Address on file confirmed.

## 2023-05-04 ENCOUNTER — HOSPITAL ENCOUNTER (OUTPATIENT)
Dept: RADIOLOGY | Facility: HOSPITAL | Age: 63
Discharge: HOME | End: 2023-05-04
Attending: ORTHOPAEDIC SURGERY
Payer: MEDICAID

## 2023-05-04 ENCOUNTER — OFFICE VISIT (OUTPATIENT)
Dept: ORTHOPEDICS | Facility: CLINIC | Age: 63
End: 2023-05-04
Payer: MEDICAID

## 2023-05-04 VITALS
HEART RATE: 56 BPM | DIASTOLIC BLOOD PRESSURE: 78 MMHG | WEIGHT: 111.75 LBS | SYSTOLIC BLOOD PRESSURE: 162 MMHG | HEIGHT: 59 IN | BODY MASS INDEX: 22.53 KG/M2

## 2023-05-04 DIAGNOSIS — M25.512 LEFT SHOULDER PAIN, UNSPECIFIED CHRONICITY: ICD-10-CM

## 2023-05-04 DIAGNOSIS — M75.02 ADHESIVE CAPSULITIS OF LEFT SHOULDER: Primary | ICD-10-CM

## 2023-05-04 DIAGNOSIS — M54.12 LEFT CERVICAL RADICULOPATHY: ICD-10-CM

## 2023-05-04 PROCEDURE — 73030 XR SHOULDER TRAUMA 3 VIEW LEFT: ICD-10-PCS | Mod: 26,LT,, | Performed by: RADIOLOGY

## 2023-05-04 PROCEDURE — 3078F DIAST BP <80 MM HG: CPT | Mod: CPTII,,, | Performed by: ORTHOPAEDIC SURGERY

## 2023-05-04 PROCEDURE — 99203 PR OFFICE/OUTPT VISIT, NEW, LEVL III, 30-44 MIN: ICD-10-PCS | Mod: S$PBB,,, | Performed by: ORTHOPAEDIC SURGERY

## 2023-05-04 PROCEDURE — 99999 PR PBB SHADOW E&M-EST. PATIENT-LVL III: ICD-10-PCS | Mod: PBBFAC,,, | Performed by: ORTHOPAEDIC SURGERY

## 2023-05-04 PROCEDURE — 3008F PR BODY MASS INDEX (BMI) DOCUMENTED: ICD-10-PCS | Mod: CPTII,,, | Performed by: ORTHOPAEDIC SURGERY

## 2023-05-04 PROCEDURE — 3077F PR MOST RECENT SYSTOLIC BLOOD PRESSURE >= 140 MM HG: ICD-10-PCS | Mod: CPTII,,, | Performed by: ORTHOPAEDIC SURGERY

## 2023-05-04 PROCEDURE — 73030 X-RAY EXAM OF SHOULDER: CPT | Mod: TC,PN,LT

## 2023-05-04 PROCEDURE — 99203 OFFICE O/P NEW LOW 30 MIN: CPT | Mod: S$PBB,,, | Performed by: ORTHOPAEDIC SURGERY

## 2023-05-04 PROCEDURE — 99999 PR PBB SHADOW E&M-EST. PATIENT-LVL III: CPT | Mod: PBBFAC,,, | Performed by: ORTHOPAEDIC SURGERY

## 2023-05-04 PROCEDURE — 3044F HG A1C LEVEL LT 7.0%: CPT | Mod: CPTII,,, | Performed by: ORTHOPAEDIC SURGERY

## 2023-05-04 PROCEDURE — 3008F BODY MASS INDEX DOCD: CPT | Mod: CPTII,,, | Performed by: ORTHOPAEDIC SURGERY

## 2023-05-04 PROCEDURE — 3078F PR MOST RECENT DIASTOLIC BLOOD PRESSURE < 80 MM HG: ICD-10-PCS | Mod: CPTII,,, | Performed by: ORTHOPAEDIC SURGERY

## 2023-05-04 PROCEDURE — 99213 OFFICE O/P EST LOW 20 MIN: CPT | Mod: PBBFAC,PN | Performed by: ORTHOPAEDIC SURGERY

## 2023-05-04 PROCEDURE — 1159F MED LIST DOCD IN RCRD: CPT | Mod: CPTII,,, | Performed by: ORTHOPAEDIC SURGERY

## 2023-05-04 PROCEDURE — 3044F PR MOST RECENT HEMOGLOBIN A1C LEVEL <7.0%: ICD-10-PCS | Mod: CPTII,,, | Performed by: ORTHOPAEDIC SURGERY

## 2023-05-04 PROCEDURE — 1159F PR MEDICATION LIST DOCUMENTED IN MEDICAL RECORD: ICD-10-PCS | Mod: CPTII,,, | Performed by: ORTHOPAEDIC SURGERY

## 2023-05-04 PROCEDURE — 73030 X-RAY EXAM OF SHOULDER: CPT | Mod: 26,LT,, | Performed by: RADIOLOGY

## 2023-05-04 PROCEDURE — 3077F SYST BP >= 140 MM HG: CPT | Mod: CPTII,,, | Performed by: ORTHOPAEDIC SURGERY

## 2023-05-04 NOTE — PROGRESS NOTES
[facility], Orthopedics and Sports Medicine  [facility]    New Patient Office Visit  05/04/2023     Subjective:      Laura Way is a 63 y.o. female referred by Dr. Crystal Matta for evaluation and treatment of left arm pain and shoulder stiffness.  This is evaluated as a personal injury.   The patient has the following symptoms: stiffness in the shoulder with pain shooting down the arm into the hand.  The symptoms began 8 months ago and are improving.  The pain has improved but the stiffness has not completely resolved yet.  Symptoms controlled by Aleve.     History of thyroid problem in past.     Outside reports reviewed: historical medical records and office notes.    Past Medical History:   Diagnosis Date    Hyperlipidemia 3/26/2013       Patient Active Problem List   Diagnosis    Hyperlipidemia    Familial hypercholesterolemia       Past Surgical History:   Procedure Laterality Date    ADENOIDECTOMY      LASIK      TONSILLECTOMY          Current Outpatient Medications   Medication Instructions    aspirin 81 mg, Daily    atorvastatin (LIPITOR) 40 mg, Oral, Daily    B1/B2/niacin/B12/protease (B-COMPLEX WITH B-12 ORAL) Oral    ergocalciferol (ERGOCALCIFEROL) 50,000 unit Cap TAKE 1 CAPSULE BY MOUTH EVERY 7 DAYS    fish oil-omega-3 fatty acids 300-1,000 mg capsule 2 g, Daily    Lacto.acidophilus-Bif.animalis (PROBIOTIC) 5 billion cell CpSP No dose, route, or frequency recorded.    multivitamin capsule 1 capsule, Daily    nystatin (MYCOSTATIN) powder Topical (Top), 2 times daily    vit A/vit C/biotin/zinc/copper (HAIR-SKIN-NAIL,VIT A,C-BIOTIN, ORAL) Oral        Review of patient's allergies indicates:  No Known Allergies    Social History     Socioeconomic History    Marital status:    Tobacco Use    Smoking status: Former    Smokeless tobacco: Never   Substance and Sexual Activity    Alcohol use: No    Drug use: No       Family History   Problem Relation Age of Onset    Heart  disease Mother     Breast cancer Mother     Cancer Father         thyroid    Thyroid disease Sister     Heart disease Maternal Grandmother     Heart disease Other     Cancer Cousin         leukemia    Diabetes Neg Hx     Hypertension Neg Hx          Review of Systems   Constitutional: Negative for chills and fever.   HENT:  Negative for hearing loss.    Eyes:  Negative for blurred vision.   Cardiovascular:  Negative for chest pain.   Respiratory:  Negative for shortness of breath.    Gastrointestinal:  Negative for abdominal pain.   Neurological:  Negative for light-headedness.        Objective:      General    Nursing note and vitals reviewed.  Constitutional: She is oriented to person, place, and time. She appears well-developed and well-nourished. No distress.   HENT:   Head: Normocephalic and atraumatic.   Eyes: Pupils are equal, round, and reactive to light.   Cardiovascular:  Normal rate and regular rhythm.            Pulmonary/Chest: Effort normal and breath sounds normal. No respiratory distress.   Neurological: She is alert and oriented to person, place, and time.   Psychiatric: She has a normal mood and affect. Her behavior is normal.         Right Shoulder Exam     Inspection/Observation   Swelling: absent  Bruising: absent  Atrophy: absent    Tenderness   The patient is experiencing no tenderness.    Range of Motion   Active abduction:  170 normal   Forward Flexion:  170 normal   External Rotation 0 degrees:  50 normal   Internal rotation 0 degrees:  T8 normal     Tests & Signs   Drop arm: negative  Rebollar test: negative  Impingement: negative  Belly Press: negative  Active Compression Test (Talbot's Sign): negative  Speed's Test: negative    Other   Sensation: normal    Comments:  Varun test- negative    Left Shoulder Exam     Inspection/Observation   Swelling: absent  Bruising: absent  Atrophy: absent    Tenderness   The patient is experiencing no tenderness.     Range of Motion   Active abduction:   150   Forward Flexion:  150   External Rotation 0 degrees:  30   Internal rotation 0 degrees:  L2     Tests & Signs   Drop arm: negative  Rebollar test: negative  Impingement: negative  Rotator Cuff Painful Arc/Range: mild  Belly Press: negative  Active Compression test (Titus's Sign): negative  Speed's Test: negative    Other   Sensation: normal     Comments:  Varun test- negative      Muscle Strength   Right Upper Extremity   Shoulder Abduction: 5/5   Shoulder Internal Rotation: 5/5   Shoulder External Rotation: 5/5   Biceps: 5/5   Left Upper Extremity  Shoulder Abduction: 5/5   Shoulder Internal Rotation: 5/5   Shoulder External Rotation: 5/5   Biceps: 5/5     Reflexes     Left Side  Biceps:  2+  Triceps:  2+  Brachioradialis:  2+  Left Sierra's Sign:  Absent    Right Side   Biceps:  2+  Triceps:  2+  Brachioradialis:  2+  Right Sierra's Sign:  absent    Vascular Exam     Right Pulses      Radial:                    2+      Left Pulses      Radial:                    2+      Imaging:  Radiographs of the left shoulder taken 05/04/2023 were personally reviewed from the [facility].  Multiple views of the shoulder are available today for review, including an AP, scapular Y, axillary view.  The glenohumeral joint demonstrates mild to moderate degenerative changes.  The acromioclavicular joint demonstrates mild to moderate degenerative changes.  The glenohumeral joint is concentrically reduced.  There is no acute fracture or dislocation.       Procedures        Assessment:       Laura Way is a 63 y.o. female seen in the office today. The primary encounter diagnosis was Adhesive capsulitis of left shoulder. A diagnosis of Left cervical radiculopathy was also pertinent to this visit.  Non-operative treatment is recommended at this time. The patient was educated to the time course for frozen shoulder to resolve.  She is past the pain phase and now will wait for motion to return. Has also a cervical radiculopathy  likely related to altered motion secondary to shoulder limitation.  Have given exercises for neck and shoulder to help resume normal function. The natural history and expected course discussed with patient. Various treatment options were discussed, including their risks and benefits. All of the patient's questions were answered.     Plan:      Tylenol 650mg TID, PRN pain.  Follow up as needed if symptoms worsen.  Home exercises given today          Hugo Guevara IV, MD   of Clinical Orthopedics  Department of Orthopedic Surgery  [facility]  Office: [phone]  Website: [email or web address]

## 2023-05-19 ENCOUNTER — PATIENT OUTREACH (OUTPATIENT)
Dept: ADMINISTRATIVE | Facility: HOSPITAL | Age: 63
End: 2023-05-19
Payer: MEDICAID

## 2023-05-21 DIAGNOSIS — E78.01 FAMILIAL HYPERCHOLESTEROLEMIA: ICD-10-CM

## 2023-05-22 RX ORDER — ATORVASTATIN CALCIUM 40 MG/1
TABLET, FILM COATED ORAL
Qty: 90 TABLET | Refills: 3 | Status: SHIPPED | OUTPATIENT
Start: 2023-05-22 | End: 2023-06-12

## 2023-06-10 DIAGNOSIS — E78.01 FAMILIAL HYPERCHOLESTEROLEMIA: ICD-10-CM

## 2023-06-12 RX ORDER — ERGOCALCIFEROL 1.25 MG/1
CAPSULE ORAL
Qty: 4 CAPSULE | Refills: 3 | Status: SHIPPED | OUTPATIENT
Start: 2023-06-12 | End: 2023-09-26

## 2023-06-12 RX ORDER — ATORVASTATIN CALCIUM 40 MG/1
TABLET, FILM COATED ORAL
Qty: 90 TABLET | Refills: 3 | Status: SHIPPED | OUTPATIENT
Start: 2023-06-12 | End: 2023-09-27 | Stop reason: SDUPTHER

## 2023-06-30 ENCOUNTER — TELEPHONE (OUTPATIENT)
Dept: NEUROLOGY | Facility: CLINIC | Age: 63
End: 2023-06-30
Payer: MEDICAID

## 2023-06-30 NOTE — TELEPHONE ENCOUNTER
----- Message from Juhi Arriaga sent at 4/27/2023 11:15 AM CDT -----  Regarding: Consult to Neurology  Good morning, Dr. Crystal Matta placed a referral for patient to Consult to Neurology. Please contact patient and assist with scheduling, thanks!    Neuropathy [G62.9]

## 2023-09-26 RX ORDER — ERGOCALCIFEROL 1.25 MG/1
CAPSULE ORAL
Qty: 4 CAPSULE | Refills: 3 | Status: SHIPPED | OUTPATIENT
Start: 2023-09-26 | End: 2024-01-02

## 2023-09-27 DIAGNOSIS — E78.01 FAMILIAL HYPERCHOLESTEROLEMIA: ICD-10-CM

## 2023-09-27 RX ORDER — ATORVASTATIN CALCIUM 40 MG/1
40 TABLET, FILM COATED ORAL DAILY
Qty: 90 TABLET | Refills: 3 | Status: SHIPPED | OUTPATIENT
Start: 2023-09-27 | End: 2024-02-09 | Stop reason: SDUPTHER

## 2023-09-27 NOTE — TELEPHONE ENCOUNTER
No care due was identified.  [facility] Embedded Care Due Messages. Reference number: [id number].   9/27/2023 1:50:27 PM CDT

## 2023-09-27 NOTE — TELEPHONE ENCOUNTER
----- Message from Lindsay Moon sent at 9/26/2023 11:19 AM CDT -----  Contact: Pt [phone]  No blue slot available to schedule an appointment for the patient.  Patient is established with which PCP: Sigrid   Reason for the visit: Medication Refills   Would the patient like a call back, or a response through their [facility] portal?:  Call Back       Pt called and stated that her Medicaid ends on 10/1/23 and wanted to know if she can seen before then.

## 2024-01-02 RX ORDER — ERGOCALCIFEROL 1.25 MG/1
CAPSULE ORAL
Qty: 4 CAPSULE | Refills: 3 | Status: SHIPPED | OUTPATIENT
Start: 2024-01-02 | End: 2024-02-09 | Stop reason: SDUPTHER

## 2024-01-02 NOTE — TELEPHONE ENCOUNTER
Refill Routing Note   Medication(s) are not appropriate for processing by [facility] for the following reason(s):        Outside of protocol    [facility] action(s):  Route             Appointments  past 12m or future 3m with PCP    Date Provider   Last Visit   2/10/2023 Crystal Matta MD   Next Visit   Visit date not found Crystal Matta MD   ED visits in past 90 days: 0        Note composed:10:31 AM 01/02/2024

## 2024-02-09 ENCOUNTER — OFFICE VISIT (OUTPATIENT)
Dept: INTERNAL MEDICINE | Facility: CLINIC | Age: 64
End: 2024-02-09
Payer: MEDICAID

## 2024-02-09 VITALS
TEMPERATURE: 98 F | DIASTOLIC BLOOD PRESSURE: 70 MMHG | BODY MASS INDEX: 21.68 KG/M2 | HEIGHT: 60 IN | HEART RATE: 70 BPM | OXYGEN SATURATION: 98 % | WEIGHT: 110.44 LBS | SYSTOLIC BLOOD PRESSURE: 136 MMHG | RESPIRATION RATE: 16 BRPM

## 2024-02-09 DIAGNOSIS — Z00.00 ROUTINE MEDICAL EXAM: Primary | ICD-10-CM

## 2024-02-09 DIAGNOSIS — E78.01 FAMILIAL HYPERCHOLESTEROLEMIA: ICD-10-CM

## 2024-02-09 PROCEDURE — 99396 PREV VISIT EST AGE 40-64: CPT | Mod: S$PBB,,, | Performed by: INTERNAL MEDICINE

## 2024-02-09 PROCEDURE — 99214 OFFICE O/P EST MOD 30 MIN: CPT | Mod: PBBFAC,PO | Performed by: INTERNAL MEDICINE

## 2024-02-09 PROCEDURE — 99999 PR PBB SHADOW E&M-EST. PATIENT-LVL IV: CPT | Mod: PBBFAC,,, | Performed by: INTERNAL MEDICINE

## 2024-02-09 RX ORDER — ATORVASTATIN CALCIUM 40 MG/1
40 TABLET, FILM COATED ORAL DAILY
Qty: 90 TABLET | Refills: 3 | Status: SHIPPED | OUTPATIENT
Start: 2024-02-09

## 2024-02-09 RX ORDER — ERGOCALCIFEROL 1.25 MG/1
50000 CAPSULE ORAL
Qty: 12 CAPSULE | Refills: 3 | Status: SHIPPED | OUTPATIENT
Start: 2024-02-09

## 2024-02-09 NOTE — PROGRESS NOTES
The patient is a 63 y.o. old female who presents to the office for a physical.    PAST MEDICAL HISTORY  Past Medical History:   Diagnosis Date    Hyperlipidemia 3/26/2013       SURGICAL HISTORY:  Past Surgical History:   Procedure Laterality Date    ADENOIDECTOMY      LASIK      TONSILLECTOMY           MEDS:  Medcard reviewed and updated    ALLERGIES: Allergy Card reviewed and updated    SOCIAL HISTORY:   The patient is a nonsmoker, denies alcohol or illicit drug use.    ROS:  GENERAL: No fever, chills, fatigability or weight loss.  SKIN: No rashes.  HEAD: No headaches or recent head trauma.  EYES: No photophobia, ocular pain or diplopia.  EARS: Denies ear pain, discharge or vertigo.  NOSE: No epistaxis or postnasal drip.  MOUTH & THROAT: No hoarseness or change in voice.   NODES: Denies swollen glands.  CHEST: Denies shortness of breath, wheezing, cough and sputum production.  CARDIOVASCULAR: Occasional chest pain, aching sensation independent of activity, denies palpitations.  ABDOMEN: Appetite fine. Denies diarrhea, abdominal pain, constipation or blood in stool.  URINARY: No dysuria or hematuria.  MUSCULOSKELETAL: No joint stiffness or swelling. Denies back pain.  NEUROLOGIC: No history of seizures.  ENDOCRINE: Denies polyuria or polydipsia.  PSYCHIATRIC: Denies mood swings, depression, homicidal or suicidal thoughts.  Positive anxiety.    SCREENINGS:  Last cholesterol:2023 .  Last colonoscopy/iFOBT: 2023  Last mammogram: 2023  Last Pap smear: 2021  Last tetanus: 2016  Last Pneumovax: none  Last eye exam: 2023  Last bone density: 2021  Last menstrual period: postmenopausal    PE:   Vitals:  Vitals:    02/09/24 1057   BP: 136/70   Pulse: 70   Resp: 16   Temp: 97.6 °F (36.4 °C)       APPEARANCE: Well nourished, well developed, in no acute distress.    EYES: Sclerae anicteric. PERRL. EOMI.      EARS: TM's intact. No retraction or perforation.    NOSE: Mucosa pink. Airway clear.  MOUTH & THROAT: No tonsillar  enlargement. No pharyngeal erythema or exudate. No stridor.  NECK: Supple, no thyromegaly.  CHEST: Lungs clear to auscultation with unlabored respirations.  CARDIOVASCULAR: Normal S1, S2. No murmurs. No carotid bruits. No pedal edema.  ABDOMEN: Bowel sounds normal. Not distended. Soft. No tenderness or masses. No organomegaly.  MUSCULOSKELETAL:  Normal gait, no cyanosis or clubbing.  SKIN: Normal skin turgor, warm and dry.  NEUROLOGIC: Cranial Nerves: Intact.  PSYCHIATRIC: The patient is oriented to person, place, and time and has a pleasant affect.        ASSESSMENT/PLAN:  Laura was seen today for annual exam and medication refill.    Diagnoses and all orders for this visit:    Routine medical exam  -     patient will contact office to inform us where she would like to have her labs done     Familial hypercholesterolemia  -     atorvastatin (LIPITOR) 40 MG tablet; Take 1 tablet (40 mg total) by mouth once daily.    Other orders  -     ergocalciferol (ERGOCALCIFEROL) 50,000 unit Cap; Take 1 capsule (50,000 Units total) by mouth every 7 days.

## 2024-02-15 ENCOUNTER — TELEPHONE (OUTPATIENT)
Dept: INTERNAL MEDICINE | Facility: CLINIC | Age: 64
End: 2024-02-15

## 2024-02-15 DIAGNOSIS — Z12.31 ENCOUNTER FOR SCREENING MAMMOGRAM FOR BREAST CANCER: Primary | ICD-10-CM

## 2024-02-15 DIAGNOSIS — Z12.11 COLON CANCER SCREENING: ICD-10-CM

## 2024-02-15 DIAGNOSIS — Z00.00 ROUTINE MEDICAL EXAM: Primary | ICD-10-CM

## 2024-02-15 NOTE — TELEPHONE ENCOUNTER
----- Message from Kori Silva sent at 2/15/2024  1:49 PM CST -----  Contact: Self/ [phone]  Caller is requesting to schedule their annual screening mammogram appointment. Order is not listed in Epic.  Please enter order and contact patient to schedule.  Would the patient like a call back, or a response through their [facility] portal?:   call back         
Mammo ordered and referral message sent   
Health Care Proxy (HCP)
Normal for race

## 2024-02-15 NOTE — TELEPHONE ENCOUNTER
----- Message from Kori Silva sent at 2/15/2024  1:48 PM CST -----  Contact: Self/ [phone]  Type: Orders Request    What orders/ testing are being requested?Cologuard     Is there a future appointment scheduled for the patient with PCP?no     When?    Would you prefer a response via [employer]?Would like a return call     Comments:

## 2024-02-15 NOTE — TELEPHONE ENCOUNTER
----- Message from Kori Silva sent at 2/15/2024  1:49 PM CST -----  Contact: Self/ [phone]  Type: Orders Request    What orders/ testing are being requested?Labs     Is there a future appointment scheduled for the patient with PCP?No     When?    Would you prefer a response via [employer]?Would like a return call     Comments:pt stated that she needs her orders to be faxed to [facility]

## 2024-02-15 NOTE — TELEPHONE ENCOUNTER
Annual labs ordered at [facility] , and mammo ordered. Referral message sent.     Please order Cologaurd

## 2024-06-27 ENCOUNTER — PATIENT OUTREACH (OUTPATIENT)
Dept: ADMINISTRATIVE | Facility: HOSPITAL | Age: 64
End: 2024-06-27

## 2025-01-24 RX ORDER — ERGOCALCIFEROL 1.25 MG/1
50000 CAPSULE ORAL
Qty: 4 CAPSULE | Refills: 3 | Status: SHIPPED | OUTPATIENT
Start: 2025-01-24

## 2025-01-24 NOTE — TELEPHONE ENCOUNTER
Care Due:                  Date            Visit Type   Department     Provider  --------------------------------------------------------------------------------                                EP -                              PRIMARY      MET INTERNAL  Last Visit: 02-      CARE ([facility])   MEDICINE       Crystal Matta  Next Visit: None Scheduled  None         None Found                                                            Last  Test          Frequency    Reason                     Performed    Due Date  --------------------------------------------------------------------------------    Office Visit  12 months..  ergocalciferol...........  02- 02-    CMP.........  12 months..  atorvastatin,              03- 03-                             ergocalciferol...........    Lipid Panel.  12 months..  atorvastatin.............  01- 01-    Vitamin D...  12 months..  ergocalciferol...........  Not Found    Overdue    [employer] Embedded Care Due Messages. Reference number: [id number].   1/24/2025 10:03:50 AM CST

## 2025-01-28 ENCOUNTER — TELEPHONE (OUTPATIENT)
Dept: INTERNAL MEDICINE | Facility: CLINIC | Age: 65
End: 2025-01-28

## 2025-01-28 DIAGNOSIS — Z00.00 ROUTINE MEDICAL EXAM: Primary | ICD-10-CM

## 2025-01-28 NOTE — TELEPHONE ENCOUNTER
----- Message from Laura sent at 1/27/2025  3:54 PM CST -----  Contact: [phone]  What orders is pt asking for?: annual lab order    Is there a future appointment with the provider?: yes    When?: 05/09/25    Comments?: patient would like order to be place in the system, unsure when she will come.

## 2025-01-30 DIAGNOSIS — E78.01 FAMILIAL HYPERCHOLESTEROLEMIA: ICD-10-CM

## 2025-01-30 NOTE — TELEPHONE ENCOUNTER
No care due was identified.  [facility] Embedded Care Due Messages. Reference number: [id number].   1/30/2025 5:13:46 PM CST

## 2025-01-30 NOTE — TELEPHONE ENCOUNTER
Refill Routing Note   Medication(s) are not appropriate for processing by [facility] for the following reason(s):        Required labs outdated: CMP & lipid panel    [facility] action(s):  Defer          Appointments  past 12m or future 3m with PCP    Date Provider   Last Visit   2/9/2024 Crystal Matta MD   Next Visit   5/9/2025 Crystal Matta MD   ED visits in past 90 days: 0        Note composed:5:15 PM 01/30/2025

## 2025-01-31 RX ORDER — ATORVASTATIN CALCIUM 40 MG/1
40 TABLET, FILM COATED ORAL
Qty: 90 TABLET | Refills: 3 | Status: SHIPPED | OUTPATIENT
Start: 2025-01-31

## 2025-03-19 ENCOUNTER — TELEPHONE (OUTPATIENT)
Dept: INTERNAL MEDICINE | Facility: CLINIC | Age: 65
End: 2025-03-19
Payer: COMMERCIAL

## 2025-03-19 DIAGNOSIS — Z12.31 VISIT FOR SCREENING MAMMOGRAM: Primary | ICD-10-CM

## 2025-03-19 DIAGNOSIS — Z78.0 MENOPAUSE: ICD-10-CM

## 2025-03-19 NOTE — TELEPHONE ENCOUNTER
----- Message from Pushpa sent at 3/18/2025  4:31 PM CDT -----  Contact: [phone]  Type: Orders RequestWhat orders/ testing are being requested? MammoIs there a future appointment scheduled for the patient with PCP?yes When?04/15/2025Would you prefer a response via [employer]? Call back Comments:

## 2025-03-27 ENCOUNTER — HOSPITAL ENCOUNTER (OUTPATIENT)
Dept: RADIOLOGY | Facility: HOSPITAL | Age: 65
Discharge: HOME | End: 2025-03-27
Attending: INTERNAL MEDICINE
Payer: MEDICARE

## 2025-03-27 VITALS — WEIGHT: 110 LBS | HEIGHT: 60 IN | BODY MASS INDEX: 21.6 KG/M2

## 2025-03-27 DIAGNOSIS — Z12.31 VISIT FOR SCREENING MAMMOGRAM: ICD-10-CM

## 2025-03-27 PROCEDURE — 77063 BREAST TOMOSYNTHESIS BI: CPT | Mod: TC

## 2025-03-27 PROCEDURE — 77067 SCR MAMMO BI INCL CAD: CPT | Mod: 26,,, | Performed by: RADIOLOGY

## 2025-03-27 PROCEDURE — 77063 BREAST TOMOSYNTHESIS BI: CPT | Mod: 26,,, | Performed by: RADIOLOGY

## 2025-03-28 ENCOUNTER — PATIENT MESSAGE (OUTPATIENT)
Dept: ADMINISTRATIVE | Facility: HOSPITAL | Age: 65
End: 2025-03-28
Payer: MEDICARE

## 2025-03-28 ENCOUNTER — RESULTS FOLLOW-UP (OUTPATIENT)
Dept: INTERNAL MEDICINE | Facility: CLINIC | Age: 65
End: 2025-03-28

## 2025-03-28 ENCOUNTER — PATIENT OUTREACH (OUTPATIENT)
Dept: ADMINISTRATIVE | Facility: HOSPITAL | Age: 65
End: 2025-03-28
Payer: MEDICARE

## 2025-03-28 DIAGNOSIS — Z12.12 SCREENING FOR COLORECTAL CANCER: Primary | ICD-10-CM

## 2025-03-28 DIAGNOSIS — Z12.11 SCREENING FOR COLORECTAL CANCER: Primary | ICD-10-CM

## 2025-03-28 DIAGNOSIS — Z12.31 SCREENING MAMMOGRAM FOR BREAST CANCER: ICD-10-CM

## 2025-03-28 DIAGNOSIS — M81.0 POST-MENOPAUSAL OSTEOPOROSIS: ICD-10-CM

## 2025-04-07 ENCOUNTER — OFFICE VISIT (OUTPATIENT)
Dept: OBSTETRICS AND GYNECOLOGY | Facility: CLINIC | Age: 65
End: 2025-04-07
Payer: MEDICARE

## 2025-04-07 VITALS — SYSTOLIC BLOOD PRESSURE: 136 MMHG | WEIGHT: 110 LBS | DIASTOLIC BLOOD PRESSURE: 85 MMHG | BODY MASS INDEX: 21.85 KG/M2

## 2025-04-07 DIAGNOSIS — Z01.419 WELL WOMAN EXAM WITH ROUTINE GYNECOLOGICAL EXAM: Primary | ICD-10-CM

## 2025-04-07 PROCEDURE — 3288F FALL RISK ASSESSMENT DOCD: CPT | Mod: CPTII,S$GLB,, | Performed by: OBSTETRICS & GYNECOLOGY

## 2025-04-07 PROCEDURE — 3075F SYST BP GE 130 - 139MM HG: CPT | Mod: CPTII,S$GLB,, | Performed by: OBSTETRICS & GYNECOLOGY

## 2025-04-07 PROCEDURE — 1101F PT FALLS ASSESS-DOCD LE1/YR: CPT | Mod: CPTII,S$GLB,, | Performed by: OBSTETRICS & GYNECOLOGY

## 2025-04-07 PROCEDURE — 3044F HG A1C LEVEL LT 7.0%: CPT | Mod: CPTII,S$GLB,, | Performed by: OBSTETRICS & GYNECOLOGY

## 2025-04-07 PROCEDURE — 3079F DIAST BP 80-89 MM HG: CPT | Mod: CPTII,S$GLB,, | Performed by: OBSTETRICS & GYNECOLOGY

## 2025-04-07 PROCEDURE — 99999 PR PBB SHADOW E&M-EST. PATIENT-LVL III: CPT | Mod: PBBFAC,,, | Performed by: OBSTETRICS & GYNECOLOGY

## 2025-04-07 PROCEDURE — G0101 CA SCREEN;PELVIC/BREAST EXAM: HCPCS | Mod: S$GLB,,, | Performed by: OBSTETRICS & GYNECOLOGY

## 2025-04-07 PROCEDURE — 1159F MED LIST DOCD IN RCRD: CPT | Mod: CPTII,S$GLB,, | Performed by: OBSTETRICS & GYNECOLOGY

## 2025-04-07 NOTE — PROGRESS NOTES
HPI:   65 y.o.   OB History          2    Para   2    Term   2            AB        Living   2         SAB        IAB        Ectopic        Multiple        Live Births                  No LMP recorded (lmp unknown). Patient is postmenopausal.    Patient is  here for her annual gynecologic exam.  She has no complaints.     ROS:  GENERAL: No fever, chills, fatigability or weight loss.  SKIN: No rashes, itching or changes in color or texture of skin.  HEAD: No headaches or recent head trauma.  EYES: Visual acuity fine. No photophobia, ocular pain or diplopia.  EARS: Denies ear pain, discharge or vertigo.  NOSE: No loss of smell, no epistaxis or postnasal drip.  MOUTH & THROAT: No hoarseness or change in voice. No excessive gum bleeding.  NODES: Denies swollen glands.  CHEST: Denies VALLE, cyanosis, wheezing, cough and sputum production.  CARDIOVASCULAR: Denies chest pain, PND, orthopnea or reduced exercise tolerance.  ABDOMEN: Appetite fine. No weight loss. Denies diarrhea, abdominal pain, hematemesis or blood in stool.  URINARY: No flank pain, dysuria or hematuria.  PERIPHERAL VASCULAR: No claudication or cyanosis.  MUSCULOSKELETAL: No joint stiffness or swelling. Denies back pain.  NEUROLOGIC: No history of seizures, paralysis, alteration of gait or coordination.    PE:   /85   Wt 49.9 kg (110 lb)   LMP  (LMP Unknown)   BMI 21.85 kg/m²   APPEARANCE: Well nourished, well developed, in no acute distress.  NECK: Neck symmetric without masses or thyromegaly.  BREASTS: Symmetrical, no skin changes or visible lesions. No palpable masses, nipple discharge or adenopathy bilaterally.  ABDOMEN: Flat. Soft. No tenderness or masses. No hepatosplenomegaly. No hernias. No CVA tenderness.  VULVA: No lesions. Normal female genitalia.  URETHRAL MEATUS: Normal size and location, no lesions, no prolapse.  URETHRA: No masses, tenderness, prolapse or scarring.  VAGINA: Moist and well rugated, no discharge, no  significant cystocele or rectocele.  CERVIX: No lesions and discharge. PAP done.  UTERUS: Normal size, regular shape, mobile, non-tender, bladder base nontender.  ADNEXA: No masses, tenderness or CDS nodularity.  ANUS PERINEUM: Normal.    PROCEDURES:  Pap smear    Assessment:  Normal Gynecologic Exam    Plan:  Mammogram and Colonoscopy if indicated by current recommendations.  Return to clinic in one year or for any problems or complaints.  No gyn co  Had mammo

## 2025-04-11 ENCOUNTER — HOSPITAL ENCOUNTER (OUTPATIENT)
Dept: RADIOLOGY | Facility: HOSPITAL | Age: 65
Discharge: HOME | End: 2025-04-11
Attending: INTERNAL MEDICINE
Payer: MEDICARE

## 2025-04-11 DIAGNOSIS — Z78.0 MENOPAUSE: ICD-10-CM

## 2025-04-11 PROCEDURE — 77080 DXA BONE DENSITY AXIAL: CPT | Mod: TC

## 2025-04-11 PROCEDURE — 77080 DXA BONE DENSITY AXIAL: CPT | Mod: 26,,, | Performed by: INTERNAL MEDICINE

## 2025-04-15 ENCOUNTER — LAB VISIT (OUTPATIENT)
Dept: LAB | Facility: HOSPITAL | Age: 65
End: 2025-04-15
Attending: INTERNAL MEDICINE
Payer: MEDICARE

## 2025-04-15 ENCOUNTER — OFFICE VISIT (OUTPATIENT)
Dept: INTERNAL MEDICINE | Facility: CLINIC | Age: 65
End: 2025-04-15
Payer: MEDICARE

## 2025-04-15 VITALS
SYSTOLIC BLOOD PRESSURE: 108 MMHG | OXYGEN SATURATION: 97 % | WEIGHT: 110.25 LBS | HEIGHT: 60 IN | HEART RATE: 60 BPM | TEMPERATURE: 97 F | DIASTOLIC BLOOD PRESSURE: 64 MMHG | BODY MASS INDEX: 21.65 KG/M2

## 2025-04-15 DIAGNOSIS — E78.01 FAMILIAL HYPERCHOLESTEROLEMIA: Primary | ICD-10-CM

## 2025-04-15 DIAGNOSIS — E78.01 FAMILIAL HYPERCHOLESTEROLEMIA: ICD-10-CM

## 2025-04-15 DIAGNOSIS — Z13.6 ENCOUNTER FOR SCREENING FOR CARDIOVASCULAR DISORDERS: ICD-10-CM

## 2025-04-15 LAB
BILIRUB UR QL STRIP.AUTO: NEGATIVE
CLARITY UR: CLEAR
COLOR UR AUTO: COLORLESS
GLUCOSE UR QL STRIP: NEGATIVE
HGB UR QL STRIP: NEGATIVE
KETONES UR QL STRIP: NEGATIVE
LEUKOCYTE ESTERASE UR QL STRIP: NEGATIVE
NITRITE UR QL STRIP: NEGATIVE
PH UR STRIP: 7 [PH]
PROT UR QL STRIP: NEGATIVE
SP GR UR STRIP: <1.005
UROBILINOGEN UR STRIP-ACNC: NEGATIVE EU/DL

## 2025-04-15 PROCEDURE — 1160F RVW MEDS BY RX/DR IN RCRD: CPT | Mod: CPTII,S$GLB,, | Performed by: INTERNAL MEDICINE

## 2025-04-15 PROCEDURE — 99214 OFFICE O/P EST MOD 30 MIN: CPT | Mod: S$GLB,,, | Performed by: INTERNAL MEDICINE

## 2025-04-15 PROCEDURE — 99999 PR PBB SHADOW E&M-EST. PATIENT-LVL IV: CPT | Mod: PBBFAC,,, | Performed by: INTERNAL MEDICINE

## 2025-04-15 PROCEDURE — 81003 URINALYSIS AUTO W/O SCOPE: CPT

## 2025-04-15 PROCEDURE — 1101F PT FALLS ASSESS-DOCD LE1/YR: CPT | Mod: CPTII,S$GLB,, | Performed by: INTERNAL MEDICINE

## 2025-04-15 PROCEDURE — G2211 COMPLEX E/M VISIT ADD ON: HCPCS | Mod: S$GLB,,, | Performed by: INTERNAL MEDICINE

## 2025-04-15 PROCEDURE — 3044F HG A1C LEVEL LT 7.0%: CPT | Mod: CPTII,S$GLB,, | Performed by: INTERNAL MEDICINE

## 2025-04-15 PROCEDURE — 3078F DIAST BP <80 MM HG: CPT | Mod: CPTII,S$GLB,, | Performed by: INTERNAL MEDICINE

## 2025-04-15 PROCEDURE — 3008F BODY MASS INDEX DOCD: CPT | Mod: CPTII,S$GLB,, | Performed by: INTERNAL MEDICINE

## 2025-04-15 PROCEDURE — 3074F SYST BP LT 130 MM HG: CPT | Mod: CPTII,S$GLB,, | Performed by: INTERNAL MEDICINE

## 2025-04-15 PROCEDURE — 3288F FALL RISK ASSESSMENT DOCD: CPT | Mod: CPTII,S$GLB,, | Performed by: INTERNAL MEDICINE

## 2025-04-15 PROCEDURE — 1159F MED LIST DOCD IN RCRD: CPT | Mod: CPTII,S$GLB,, | Performed by: INTERNAL MEDICINE

## 2025-04-15 NOTE — PROGRESS NOTES
CC:  Annual review of chronic medical problems  HPI:   The patient is a 65 y.o. old female who presents to the office for annual review of chronic medical problems.  Review of labs reveals normal results.    PAST MEDICAL HISTORY  Past Medical History:   Diagnosis Date    Hyperlipidemia 3/26/2013       SURGICAL HISTORY:  Past Surgical History:   Procedure Laterality Date    ADENOIDECTOMY      LASIK      TONSILLECTOMY           MEDS:  Medcard reviewed and updated    ALLERGIES: Allergy Card reviewed and updated    SOCIAL HISTORY:   The patient is a nonsmoker, denies alcohol or illicit drug use.    ROS:  GENERAL: No fever, chills, fatigability or weight loss.  SKIN: No rashes.  HEAD: Occasional sinus headaches.  Denies recent head trauma.  EYES: No photophobia, ocular pain or diplopia.  EARS: Denies ear pain, discharge or vertigo.  NOSE: No epistaxis or postnasal drip.  MOUTH & THROAT: No hoarseness or change in voice.   NODES: Denies swollen glands.  CHEST: Denies shortness of breath, wheezing, cough and sputum production.  CARDIOVASCULAR: Denies chest pain or palpitations.  ABDOMEN: Appetite fine. Denies diarrhea, abdominal pain, constipation or blood in stool.  URINARY: No dysuria or hematuria.  MUSCULOSKELETAL: No joint stiffness or swelling. Denies back pain.  NEUROLOGIC: No history of seizures.  ENDOCRINE: Denies polyuria or polydipsia.  PSYCHIATRIC: Denies mood swings, depression, anxiety, homicidal or suicidal thoughts.    SCREENINGS:  Last cholesterol:  2025 .  Last colonoscopy/Cologuard:  2025  Last mammogram:  March 27, 2025  Last Pap smear:  2025  Last tetanus:  2016  Last Pneumovax:  None  Last eye exam: 2025  Last bone density:  April 11, 2025  Last menstrual period: postmenopausal    PE:   Vitals:  Vitals:    04/15/25 0917   BP: 108/64   Pulse: 60   Temp: 97.4 °F (36.3 °C)       APPEARANCE: Well nourished, well developed, in no acute distress.    EYES: Sclerae anicteric. PERRL. EOMI.      EARS: TM's  intact. No retraction or perforation.    NOSE: Mucosa pink. Airway clear.  MOUTH & THROAT: No tonsillar enlargement. No pharyngeal erythema or exudate. No stridor.  NECK: Supple, no thyromegaly.  CHEST: Lungs clear to auscultation with unlabored respirations.  CARDIOVASCULAR: Normal S1, S2. No murmurs. No carotid bruits. No pedal edema.  ABDOMEN: Bowel sounds normal. Not distended. Soft. No tenderness or masses.   MUSCULOSKELETAL:  Normal gait, no cyanosis or clubbing.   SKIN: Normal skin turgor, warm and dry.  NEUROLOGIC: Cranial Nerves: Intact.  PSYCHIATRIC: The patient is oriented to person, place, and time and has a pleasant affect.        ASSESSMENT/PLAN:  Laura was seen today for annual exam.    Diagnoses and all orders for this visit:    Familial hypercholesterolemia  -     Urinalysis; Future  -     well controlled, continue atorvastatin    Encounter for screening for cardiovascular disorders  -     CT Cardiac Scoring; Future

## 2025-04-25 ENCOUNTER — RESULTS FOLLOW-UP (OUTPATIENT)
Dept: INTERNAL MEDICINE | Facility: CLINIC | Age: 65
End: 2025-04-25

## 2025-04-28 ENCOUNTER — RESULTS FOLLOW-UP (OUTPATIENT)
Dept: OBSTETRICS AND GYNECOLOGY | Facility: CLINIC | Age: 65
End: 2025-04-28

## 2025-04-29 ENCOUNTER — HOSPITAL ENCOUNTER (OUTPATIENT)
Dept: RADIOLOGY | Facility: HOSPITAL | Age: 65
Discharge: HOME | End: 2025-04-29
Attending: INTERNAL MEDICINE
Payer: MEDICARE

## 2025-04-29 DIAGNOSIS — Z13.6 ENCOUNTER FOR SCREENING FOR CARDIOVASCULAR DISORDERS: ICD-10-CM

## 2025-04-29 PROCEDURE — 75571 CT HRT W/O DYE W/CA TEST: CPT | Mod: TC

## 2025-04-29 PROCEDURE — 75571 CT HRT W/O DYE W/CA TEST: CPT | Mod: 26,,, | Performed by: STUDENT IN AN ORGANIZED HEALTH CARE EDUCATION/TRAINING PROGRAM

## 2025-05-07 ENCOUNTER — RESULTS FOLLOW-UP (OUTPATIENT)
Dept: INTERNAL MEDICINE | Facility: CLINIC | Age: 65
End: 2025-05-07

## 2025-05-17 NOTE — TELEPHONE ENCOUNTER
Care Due:                  Date            Visit Type   Department     Provider  --------------------------------------------------------------------------------                                EP -                              PRIMARY      MET INTERNAL  Last Visit: 04-      CARE ([facility])   MEDICINE       Crystal Matta  Next Visit: None Scheduled  None         None Found                                                            Last  Test          Frequency    Reason                     Performed    Due Date  --------------------------------------------------------------------------------    Vitamin D...  12 months..  ergocalciferol...........  Not Found    Overdue    [employer] Embedded Care Due Messages. Reference number: [id number].   5/17/2025 2:05:48 PM CDT

## 2025-05-19 RX ORDER — ERGOCALCIFEROL 1.25 MG/1
50000 CAPSULE ORAL
Qty: 4 CAPSULE | Refills: 0 | Status: SHIPPED | OUTPATIENT
Start: 2025-05-19

## 2025-05-19 NOTE — TELEPHONE ENCOUNTER
Refill Routing Note   Medication(s) are not appropriate for processing by [facility] for the following reason(s):        Outside of protocol  Required labs outdated    [facility] action(s):  Route     Requires labs : Yes             Appointments  past 12m or future 3m with PCP    Date Provider   Last Visit   4/15/2025 Crystal Matta MD   Next Visit   Visit date not found Crystal Matta MD   ED visits in past 90 days: 0        Note composed:8:48 AM 05/19/2025

## 2025-06-13 RX ORDER — ERGOCALCIFEROL 1.25 MG/1
50000 CAPSULE ORAL
Qty: 4 CAPSULE | Refills: 0 | Status: SHIPPED | OUTPATIENT
Start: 2025-06-13

## 2025-06-13 NOTE — TELEPHONE ENCOUNTER
Refill Routing Note   Medication(s) are not appropriate for processing by [facility] for the following reason(s):        Outside of protocol    [facility] action(s):  Route               Appointments  past 12m or future 3m with PCP    Date Provider   Last Visit   4/15/2025 Crystal Matta MD   Next Visit   Visit date not found Crystal Matta MD   ED visits in past 90 days: 0        Note composed:11:21 AM 06/13/2025

## 2025-06-13 NOTE — TELEPHONE ENCOUNTER
No care due was identified.  [facility] Embedded Care Due Messages. Reference number: [id number].   6/13/2025 10:03:53 AM CDT

## 2025-07-11 RX ORDER — ERGOCALCIFEROL 1.25 MG/1
50000 CAPSULE ORAL
Qty: 4 CAPSULE | Refills: 0 | Status: SHIPPED | OUTPATIENT
Start: 2025-07-11

## 2025-07-16 ENCOUNTER — TELEPHONE (OUTPATIENT)
Dept: OBSTETRICS AND GYNECOLOGY | Facility: CLINIC | Age: 65
End: 2025-07-16
Payer: MEDICARE

## 2025-07-16 NOTE — TELEPHONE ENCOUNTER
"Copied from CRM #[id number]. Topic: General Inquiry - Patient Advice  >> Jul 16, 2025  9:16 AM Arabella wrote:  Pt Requesting Call Back    Who called: pt  Who called for pt:  Best call back #: [phone]  Add notes: pt said Dr. Wiedemann told her "after the summer call him back"; said she would like to know from the nurse does Dr. Wiedemann do procedures for atypical cells; if he does pt have other questions regarding the  surgery  "

## 2025-07-16 NOTE — TELEPHONE ENCOUNTER
Copied from CRM #[id number]. Topic: General Inquiry - Return Call  >> Jul 16, 2025  9:49 AM Ranjit wrote:  Type:  Patient Returning Call    Who Called:pt     Who Left Message for Patient:Holly     Does the patient know what this is regarding?:yes     Would the patient rather a call back or a response via [facility]? Call Back     Best Call Back Number:[phone]     Additional Information: please contact and advise, thank you

## 2025-07-16 NOTE — TELEPHONE ENCOUNTER
Contacted pt back in regards to atypical cells procedure   Informed pt since this was first atypical pap and her hpv was negative we will get pt back in 4-6 months to repeat pap  Informed pt if pap comes back atypical again will get pt back in to do a colpo, a in office procedure where dr. Wiedemann will put a vinegar solution on pts cervix and look at cervix with a microscope  Informed pt if sees anything abnormal will taker a biopsy and send it off  Scheduled pt for 9/9 @2:15 for repeat pap   Pt verbally confirmed and understood

## 2025-08-25 RX ORDER — ERGOCALCIFEROL 1.25 MG/1
50000 CAPSULE ORAL
Qty: 4 CAPSULE | Refills: 0 | Status: SHIPPED | OUTPATIENT
Start: 2025-08-25